# Patient Record
Sex: FEMALE | Race: WHITE | Employment: OTHER | URBAN - METROPOLITAN AREA
[De-identification: names, ages, dates, MRNs, and addresses within clinical notes are randomized per-mention and may not be internally consistent; named-entity substitution may affect disease eponyms.]

---

## 2017-02-14 ENCOUNTER — OFFICE VISIT (OUTPATIENT)
Dept: FAMILY MEDICINE CLINIC | Age: 62
End: 2017-02-14

## 2017-02-14 VITALS
RESPIRATION RATE: 16 BRPM | HEART RATE: 88 BPM | WEIGHT: 168.25 LBS | HEIGHT: 68 IN | SYSTOLIC BLOOD PRESSURE: 118 MMHG | DIASTOLIC BLOOD PRESSURE: 78 MMHG | BODY MASS INDEX: 25.5 KG/M2

## 2017-02-14 DIAGNOSIS — K21.00 CHRONIC REFLUX ESOPHAGITIS: ICD-10-CM

## 2017-02-14 DIAGNOSIS — J40 BRONCHITIS: Primary | ICD-10-CM

## 2017-02-14 PROCEDURE — 99213 OFFICE O/P EST LOW 20 MIN: CPT | Performed by: EMERGENCY MEDICINE

## 2017-02-14 RX ORDER — PANTOPRAZOLE SODIUM 40 MG/1
40 TABLET, DELAYED RELEASE ORAL DAILY
Qty: 90 TABLET | Refills: 2 | Status: SHIPPED | OUTPATIENT
Start: 2017-02-14 | End: 2017-09-05 | Stop reason: SDUPTHER

## 2017-02-14 RX ORDER — LEVOFLOXACIN 500 MG/1
500 TABLET, FILM COATED ORAL DAILY
Qty: 10 TABLET | Refills: 0 | Status: SHIPPED | OUTPATIENT
Start: 2017-02-14 | End: 2017-02-24

## 2017-02-14 ASSESSMENT — ENCOUNTER SYMPTOMS
TROUBLE SWALLOWING: 0
CHEST TIGHTNESS: 0
COUGH: 1
DIARRHEA: 0
SORE THROAT: 1
VOMITING: 0
BACK PAIN: 0
CONSTIPATION: 0
SINUS PRESSURE: 0
SHORTNESS OF BREATH: 0
ABDOMINAL PAIN: 0
VOICE CHANGE: 0
NAUSEA: 0
RHINORRHEA: 0
WHEEZING: 0

## 2017-02-16 ENCOUNTER — TELEPHONE (OUTPATIENT)
Dept: FAMILY MEDICINE CLINIC | Age: 62
End: 2017-02-16

## 2017-03-24 ENCOUNTER — TELEPHONE (OUTPATIENT)
Dept: FAMILY MEDICINE CLINIC | Age: 62
End: 2017-03-24

## 2017-03-24 RX ORDER — AZITHROMYCIN 250 MG/1
TABLET, FILM COATED ORAL
Qty: 1 PACKET | Refills: 0 | Status: SHIPPED | OUTPATIENT
Start: 2017-03-24 | End: 2017-04-03

## 2017-04-04 RX ORDER — ESCITALOPRAM OXALATE 10 MG/1
TABLET ORAL
Qty: 30 TABLET | Refills: 0 | Status: SHIPPED | OUTPATIENT
Start: 2017-04-04 | End: 2017-05-07 | Stop reason: SDUPTHER

## 2017-08-28 RX ORDER — ESCITALOPRAM OXALATE 10 MG/1
TABLET ORAL
Qty: 30 TABLET | Refills: 0 | Status: SHIPPED | OUTPATIENT
Start: 2017-08-28 | End: 2017-09-05 | Stop reason: SDUPTHER

## 2017-09-05 ENCOUNTER — OFFICE VISIT (OUTPATIENT)
Dept: FAMILY MEDICINE CLINIC | Age: 62
End: 2017-09-05

## 2017-09-05 VITALS
HEIGHT: 68 IN | RESPIRATION RATE: 14 BRPM | DIASTOLIC BLOOD PRESSURE: 76 MMHG | HEART RATE: 76 BPM | BODY MASS INDEX: 28.01 KG/M2 | SYSTOLIC BLOOD PRESSURE: 134 MMHG | WEIGHT: 184.8 LBS

## 2017-09-05 DIAGNOSIS — K29.51 CHRONIC GASTRITIS WITH BLEEDING, UNSPECIFIED GASTRITIS TYPE: ICD-10-CM

## 2017-09-05 DIAGNOSIS — K21.00 CHRONIC REFLUX ESOPHAGITIS: ICD-10-CM

## 2017-09-05 DIAGNOSIS — K52.9 IBD (INFLAMMATORY BOWEL DISEASE): ICD-10-CM

## 2017-09-05 DIAGNOSIS — L40.9 PSORIASIS: Primary | ICD-10-CM

## 2017-09-05 DIAGNOSIS — F32.A DEPRESSION, UNSPECIFIED DEPRESSION TYPE: ICD-10-CM

## 2017-09-05 PROCEDURE — 99213 OFFICE O/P EST LOW 20 MIN: CPT | Performed by: EMERGENCY MEDICINE

## 2017-09-05 RX ORDER — PANTOPRAZOLE SODIUM 40 MG/1
40 TABLET, DELAYED RELEASE ORAL 2 TIMES DAILY
Qty: 180 TABLET | Refills: 2 | Status: SHIPPED | OUTPATIENT
Start: 2017-09-05 | End: 2018-10-18 | Stop reason: SDUPTHER

## 2017-09-05 RX ORDER — ESCITALOPRAM OXALATE 20 MG/1
20 TABLET ORAL DAILY
Qty: 90 TABLET | Refills: 1 | Status: SHIPPED | OUTPATIENT
Start: 2017-09-05 | End: 2018-02-20 | Stop reason: ALTCHOICE

## 2017-09-05 ASSESSMENT — ENCOUNTER SYMPTOMS
DIARRHEA: 0
BACK PAIN: 0
SINUS PRESSURE: 0
CONSTIPATION: 0
VOMITING: 0
VOICE CHANGE: 0
WHEEZING: 0
RHINORRHEA: 0
CHEST TIGHTNESS: 0
NAUSEA: 0
TROUBLE SWALLOWING: 0
COUGH: 0
SHORTNESS OF BREATH: 0
SORE THROAT: 0
ABDOMINAL PAIN: 0

## 2017-09-27 RX ORDER — ESCITALOPRAM OXALATE 10 MG/1
TABLET ORAL
Qty: 30 TABLET | Refills: 5 | Status: SHIPPED | OUTPATIENT
Start: 2017-09-27 | End: 2018-02-06 | Stop reason: ALTCHOICE

## 2018-02-06 ENCOUNTER — OFFICE VISIT (OUTPATIENT)
Dept: FAMILY MEDICINE CLINIC | Age: 63
End: 2018-02-06

## 2018-02-06 VITALS
BODY MASS INDEX: 28.72 KG/M2 | HEART RATE: 78 BPM | DIASTOLIC BLOOD PRESSURE: 90 MMHG | HEIGHT: 68 IN | WEIGHT: 189.5 LBS | SYSTOLIC BLOOD PRESSURE: 138 MMHG | RESPIRATION RATE: 14 BRPM

## 2018-02-06 DIAGNOSIS — S39.012A STRAIN OF LUMBAR REGION, INITIAL ENCOUNTER: Primary | ICD-10-CM

## 2018-02-06 DIAGNOSIS — S76.011A HIP STRAIN, RIGHT, INITIAL ENCOUNTER: ICD-10-CM

## 2018-02-06 DIAGNOSIS — S80.11XA TRAUMATIC ECCHYMOSIS OF LOWER LEG, RIGHT, INITIAL ENCOUNTER: ICD-10-CM

## 2018-02-06 PROCEDURE — 99213 OFFICE O/P EST LOW 20 MIN: CPT | Performed by: EMERGENCY MEDICINE

## 2018-02-06 ASSESSMENT — ENCOUNTER SYMPTOMS
DIARRHEA: 0
ABDOMINAL PAIN: 0
SHORTNESS OF BREATH: 0
VOICE CHANGE: 0
RHINORRHEA: 0
CONSTIPATION: 0
BACK PAIN: 1
WHEEZING: 0
SORE THROAT: 0
TROUBLE SWALLOWING: 0
NAUSEA: 0
SINUS PRESSURE: 0
COUGH: 0
VOMITING: 0
CHEST TIGHTNESS: 0

## 2018-02-07 ENCOUNTER — HOSPITAL ENCOUNTER (OUTPATIENT)
Dept: GENERAL RADIOLOGY | Age: 63
Discharge: HOME | End: 2018-02-07
Payer: COMMERCIAL

## 2018-02-07 ENCOUNTER — HOSPITAL ENCOUNTER (OUTPATIENT)
Age: 63
Discharge: HOME | End: 2018-02-07
Payer: COMMERCIAL

## 2018-02-07 DIAGNOSIS — S39.012A STRAIN OF LUMBAR REGION, INITIAL ENCOUNTER: ICD-10-CM

## 2018-02-07 DIAGNOSIS — S76.011A HIP STRAIN, RIGHT, INITIAL ENCOUNTER: ICD-10-CM

## 2018-02-07 DIAGNOSIS — S80.11XA TRAUMATIC ECCHYMOSIS OF LOWER LEG, RIGHT, INITIAL ENCOUNTER: ICD-10-CM

## 2018-02-07 PROCEDURE — 72170 X-RAY EXAM OF PELVIS: CPT

## 2018-02-07 PROCEDURE — 72100 X-RAY EXAM L-S SPINE 2/3 VWS: CPT

## 2018-02-07 PROCEDURE — 73590 X-RAY EXAM OF LOWER LEG: CPT

## 2018-02-08 ENCOUNTER — TELEPHONE (OUTPATIENT)
Dept: FAMILY MEDICINE CLINIC | Age: 63
End: 2018-02-08

## 2018-02-08 DIAGNOSIS — S33.5XXA LUMBAR SPRAIN, INITIAL ENCOUNTER: Primary | ICD-10-CM

## 2018-02-08 DIAGNOSIS — S76.011D STRAIN OF RIGHT HIP, SUBSEQUENT ENCOUNTER: ICD-10-CM
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
## 2018-02-08 RX ORDER — TRAMADOL HYDROCHLORIDE 50 MG/1
50 TABLET ORAL EVERY 6 HOURS PRN
Qty: 28 TABLET | Refills: 0 | Status: SHIPPED | OUTPATIENT
Start: 2018-02-08 | End: 2018-02-15

## 2018-02-08 RX ORDER — PREDNISONE 10 MG/1
TABLET ORAL
Qty: 15 TABLET | Refills: 0 | Status: SHIPPED | OUTPATIENT
Start: 2018-02-08 | End: 2018-02-20 | Stop reason: ALTCHOICE

## 2018-02-08 NOTE — TELEPHONE ENCOUNTER
----- Message from Deborah Arredondo MD sent at 2/8/2018 10:15 AM EST -----  Please call the patient, lumbar spine showed degenerative disc disease, old compression fracture at L1, knee joint showed osteoarthritis, no fracture of the tibia, pelvis showed osteoarthritis and hip and SI joint

## 2018-02-20 ENCOUNTER — OFFICE VISIT (OUTPATIENT)
Dept: FAMILY MEDICINE CLINIC | Age: 63
End: 2018-02-20

## 2018-02-20 VITALS
DIASTOLIC BLOOD PRESSURE: 84 MMHG | TEMPERATURE: 98.3 F | SYSTOLIC BLOOD PRESSURE: 130 MMHG | WEIGHT: 186.8 LBS | RESPIRATION RATE: 14 BRPM | HEIGHT: 68 IN | BODY MASS INDEX: 28.31 KG/M2 | HEART RATE: 76 BPM

## 2018-02-20 DIAGNOSIS — J40 BRONCHITIS: Primary | ICD-10-CM

## 2018-02-20 DIAGNOSIS — K29.50 CHRONIC GASTRITIS, PRESENCE OF BLEEDING UNSPECIFIED, UNSPECIFIED GASTRITIS TYPE: ICD-10-CM

## 2018-02-20 DIAGNOSIS — K21.00 CHRONIC REFLUX ESOPHAGITIS: ICD-10-CM

## 2018-02-20 DIAGNOSIS — J06.9 UPPER RESPIRATORY TRACT INFECTION, UNSPECIFIED TYPE: ICD-10-CM

## 2018-02-20 PROCEDURE — 99213 OFFICE O/P EST LOW 20 MIN: CPT | Performed by: EMERGENCY MEDICINE

## 2018-02-20 RX ORDER — SUCRALFATE ORAL 1 G/10ML
1 SUSPENSION ORAL 4 TIMES DAILY
Qty: 1200 ML | Refills: 3 | Status: SHIPPED | OUTPATIENT
Start: 2018-02-20 | End: 2020-06-18 | Stop reason: SDUPTHER

## 2018-02-20 RX ORDER — LEVOFLOXACIN 500 MG/1
500 TABLET, FILM COATED ORAL DAILY
Qty: 10 TABLET | Refills: 0 | Status: SHIPPED | OUTPATIENT
Start: 2018-02-20 | End: 2018-03-02

## 2018-02-20 ASSESSMENT — ENCOUNTER SYMPTOMS
SINUS PRESSURE: 1
SHORTNESS OF BREATH: 1
COUGH: 1
RHINORRHEA: 1
WHEEZING: 1

## 2018-02-20 NOTE — PROGRESS NOTES
Visit Date: 2/20/2018    Subjective:    Noa Gray is a 66 y.o. female who presents today for:  Chief Complaint   Patient presents with    Cough    Chest Congestion    Generalized Body Aches         HPI:     HPI     cough and cold for the past 3-4 days. No fever. Coughing green brown phlem. No vomiting or diarrhea    Still having reflux when sleeping, , reflux wakes her up and cause her to vomit      Current Home Medications:  Current Outpatient Prescriptions   Medication Sig Dispense Refill    levofloxacin (LEVAQUIN) 500 MG tablet Take 1 tablet by mouth daily for 10 days 10 tablet 0    sucralfate (CARAFATE) 1 GM/10ML suspension Take 10 mLs by mouth 4 times daily 1200 mL 3    pantoprazole (PROTONIX) 40 MG tablet Take 1 tablet by mouth 2 times daily 180 tablet 2     No current facility-administered medications for this visit. Subjective:      Review of Systems   HENT: Positive for congestion, rhinorrhea and sinus pressure. Respiratory: Positive for cough, shortness of breath and wheezing. Objective:     /84 (Site: Right Arm, Position: Sitting, Cuff Size: Medium Adult)   Pulse 76   Temp 98.3 °F (36.8 °C)   Resp 14   Ht 5' 8\" (1.727 m)   Wt 186 lb 12.8 oz (84.7 kg)   Breastfeeding? No   BMI 28.40 kg/m²   BP Readings from Last 3 Encounters:   02/20/18 130/84   02/06/18 (!) 138/90   09/05/17 134/76     Wt Readings from Last 3 Encounters:   02/20/18 186 lb 12.8 oz (84.7 kg)   02/06/18 189 lb 8 oz (86 kg)   09/05/17 184 lb 12.8 oz (83.8 kg)       Physical Exam   Constitutional: She is oriented to person, place, and time. She appears well-developed and well-nourished. She is cooperative. HENT:   Head: Normocephalic and atraumatic. Right Ear: External ear normal.   Left Ear: External ear normal.   Nose: Mucosal edema and rhinorrhea present. Right sinus exhibits no maxillary sinus tenderness. Left sinus exhibits no maxillary sinus tenderness.    Mouth/Throat: Posterior oropharyngeal erythema present. Eyes: Conjunctivae and EOM are normal. Pupils are equal, round, and reactive to light. No scleral icterus. Neck: Normal range of motion. Neck supple. No JVD present. No thyromegaly present. Cardiovascular: Normal rate, regular rhythm and intact distal pulses. Exam reveals no friction rub. No murmur heard. Pulmonary/Chest: Effort normal. She has decreased breath sounds. She has no wheezes. She has rhonchi. She has no rales. She exhibits no tenderness. Abdominal: Soft. Bowel sounds are normal. She exhibits no mass. There is no tenderness. Musculoskeletal: She exhibits no edema. Lymphadenopathy:     She has no cervical adenopathy. Neurological: She is alert and oriented to person, place, and time. Skin: No rash noted. Vitals reviewed. Assessment:        1. Bronchitis     2. Upper respiratory tract infection, unspecified type     3. Chronic gastritis, presence of bleeding unspecified, unspecified gastritis type     4. Chronic reflux esophagitis         Plan:      Medications Prescribed:  Orders Placed This Encounter   Medications    levofloxacin (LEVAQUIN) 500 MG tablet     Sig: Take 1 tablet by mouth daily for 10 days     Dispense:  10 tablet     Refill:  0    sucralfate (CARAFATE) 1 GM/10ML suspension     Sig: Take 10 mLs by mouth 4 times daily     Dispense:  1200 mL     Refill:  3     Orders Placed:  No orders of the defined types were placed in this encounter. Return if symptoms worsen or fail to improve. Discussed use, benefit, and side effects of prescribed medications. All patient questions answered. Pt voiced understanding. Instructed to continue current medications, diet and exercise. Patient agreed with treatment plan.

## 2018-02-22 ENCOUNTER — OFFICE VISIT (OUTPATIENT)
Dept: FAMILY MEDICINE CLINIC | Age: 63
End: 2018-02-22

## 2018-02-22 VITALS
WEIGHT: 186.2 LBS | SYSTOLIC BLOOD PRESSURE: 114 MMHG | HEIGHT: 68 IN | DIASTOLIC BLOOD PRESSURE: 76 MMHG | RESPIRATION RATE: 14 BRPM | HEART RATE: 86 BPM | BODY MASS INDEX: 28.22 KG/M2

## 2018-02-22 DIAGNOSIS — J40 BRONCHITIS: ICD-10-CM

## 2018-02-22 DIAGNOSIS — R68.89 FLU-LIKE SYMPTOMS: Primary | ICD-10-CM

## 2018-02-22 PROCEDURE — 99213 OFFICE O/P EST LOW 20 MIN: CPT | Performed by: EMERGENCY MEDICINE

## 2018-02-22 RX ORDER — OSELTAMIVIR PHOSPHATE 75 MG/1
75 CAPSULE ORAL 2 TIMES DAILY
Qty: 10 CAPSULE | Refills: 0 | Status: SHIPPED | OUTPATIENT
Start: 2018-02-22 | End: 2018-02-27

## 2018-02-22 ASSESSMENT — ENCOUNTER SYMPTOMS
WHEEZING: 0
COUGH: 1
BACK PAIN: 0
NAUSEA: 0
VOICE CHANGE: 0
SINUS PRESSURE: 0
CHEST TIGHTNESS: 0
TROUBLE SWALLOWING: 0
RHINORRHEA: 1
VOMITING: 0
CONSTIPATION: 0
ABDOMINAL PAIN: 0
DIARRHEA: 0
SORE THROAT: 0
SHORTNESS OF BREATH: 0

## 2018-02-22 NOTE — PROGRESS NOTES
130/84   02/06/18 (!) 138/90     Wt Readings from Last 3 Encounters:   02/22/18 186 lb 3.2 oz (84.5 kg)   02/20/18 186 lb 12.8 oz (84.7 kg)   02/06/18 189 lb 8 oz (86 kg)       Physical Exam   Constitutional: She is oriented to person, place, and time. She appears well-developed and well-nourished. She is cooperative. HENT:   Head: Normocephalic and atraumatic. Right Ear: External ear normal.   Left Ear: External ear normal.   Nose: Mucosal edema and rhinorrhea present. Mouth/Throat: Posterior oropharyngeal erythema present. Eyes: Conjunctivae and EOM are normal. Pupils are equal, round, and reactive to light. No scleral icterus. Neck: Normal range of motion. Neck supple. No JVD present. No thyromegaly present. Cardiovascular: Normal rate, regular rhythm and intact distal pulses. Exam reveals no friction rub. No murmur heard. Pulmonary/Chest: Effort normal. She has decreased breath sounds. She has no wheezes. She has rhonchi. She has no rales. She exhibits no tenderness. Abdominal: Soft. Bowel sounds are normal. She exhibits no mass. There is no tenderness. Musculoskeletal: She exhibits no edema. Lymphadenopathy:     She has no cervical adenopathy. Neurological: She is alert and oriented to person, place, and time. Skin: No rash noted. Vitals reviewed. Assessment:        1. Flu-like symptoms     2. Bronchitis         Plan:      Medications Prescribed:  Orders Placed This Encounter   Medications    oseltamivir (TAMIFLU) 75 MG capsule     Sig: Take 1 capsule by mouth 2 times daily for 5 days     Dispense:  10 capsule     Refill:  0     Orders Placed:  No orders of the defined types were placed in this encounter. No Follow-up on file. Discussed use, benefit, and side effects of prescribed medications. All patient questions answered. Pt voiced understanding. Instructed to continue current medications, diet and exercise. Patient agreed with treatment plan.

## 2018-02-22 NOTE — LETTER
[facility]  [address],[facility] [address]  Phone: [phone]  Fax: [phone]    Laura Cason MD        February 22, 2018     Patient: Teresa Ca   YOB: 1955   Date of Visit: 2/22/2018       To Whom it May Concern:    Gonzalo Spain was seen in my clinic on 2/22/2018. She is still sick Please extend her excuse from work. She may return to work on 2/26/18. If you have any questions or concerns, please don't hesitate to call.     Sincerely,         Laura Cason MD

## 2018-09-25 ENCOUNTER — TELEPHONE (OUTPATIENT)
Dept: FAMILY MEDICINE CLINIC | Age: 63
End: 2018-09-25

## 2018-09-25 RX ORDER — AZITHROMYCIN 250 MG/1
TABLET, FILM COATED ORAL
Qty: 1 PACKET | Refills: 0 | Status: SHIPPED | OUTPATIENT
Start: 2018-09-25 | End: 2018-09-28

## 2018-09-25 NOTE — TELEPHONE ENCOUNTER
Pt called stating that she has cough, head and chest congestion, stuffy nose, and low grade fever. She is asking for something to be called in to help.     Send to [facility] in [facility]

## 2018-09-26 ENCOUNTER — OFFICE VISIT (OUTPATIENT)
Dept: FAMILY MEDICINE CLINIC | Age: 63
End: 2018-09-26

## 2018-09-26 VITALS
BODY MASS INDEX: 25.85 KG/M2 | SYSTOLIC BLOOD PRESSURE: 120 MMHG | TEMPERATURE: 98.2 F | DIASTOLIC BLOOD PRESSURE: 70 MMHG | HEIGHT: 68 IN | HEART RATE: 80 BPM | RESPIRATION RATE: 16 BRPM | WEIGHT: 170.6 LBS

## 2018-09-26 DIAGNOSIS — J01.00 ACUTE NON-RECURRENT MAXILLARY SINUSITIS: Primary | ICD-10-CM

## 2018-09-26 PROCEDURE — 99213 OFFICE O/P EST LOW 20 MIN: CPT | Performed by: FAMILY MEDICINE

## 2018-09-26 ASSESSMENT — PATIENT HEALTH QUESTIONNAIRE - PHQ9
SUM OF ALL RESPONSES TO PHQ QUESTIONS 1-9: 0
SUM OF ALL RESPONSES TO PHQ QUESTIONS 1-9: 0
1. LITTLE INTEREST OR PLEASURE IN DOING THINGS: 0

## 2018-09-26 ASSESSMENT — ENCOUNTER SYMPTOMS
SINUS PRESSURE: 1
ABDOMINAL PAIN: 0
CONSTIPATION: 0
SHORTNESS OF BREATH: 0
SINUS PAIN: 1
CHEST TIGHTNESS: 0
COUGH: 1
NAUSEA: 0
DIARRHEA: 0
EYES NEGATIVE: 1
SORE THROAT: 0
VOMITING: 0

## 2018-09-26 NOTE — LETTER
[facility]  [address] [address]  Phone: [phone]  Fax: [phone]    Jerica Scherer MD        September 26, 2018     Patient: Hien Flores   YOB: 1955   Date of Visit: 9/26/2018       To Whom it May Concern:    Hamilton Elizabeth was seen in my clinic on 9/26/2018. She may return to work on 9/28/18. If you have any questions or concerns, please don't hesitate to call.     Sincerely,         Jerica Scherer MD

## 2018-09-26 NOTE — PROGRESS NOTES
Visit Date: 9/26/2018    Rebecca Gibson is a 66 y.o. female who presents today for:  Chief Complaint   Patient presents with    Nasal Congestion    Chest Congestion    Cough she states that it is mostly dry    Fatigue she feels tired. She just started a Z-pack last night. HPI:     HPI    has a current medication list which includes the following prescription(s): azithromycin, sucralfate, and pantoprazole. Allergies   Allergen Reactions    Codeine Nausea And Vomiting and Other (See Comments)     headache    Penicillins Rash       Social History   Substance Use Topics    Smoking status: Former Smoker     Packs/day: 0.00     Years: 0.00     Types: Cigarettes     Quit date: 9/26/2001    Smokeless tobacco: Never Used    Alcohol use No       Past Medical History:   Diagnosis Date    Anemia 4/3/14    Chronic gastritis 4/22/14    Chronic reflux esophagitis 4/22/14    Compression fracture     Depression     Diarrhea     Fracture     compression    Fracture of pelvis ([facility] [address] 2008    GI bleed     History of colon polyps     Psoriasis     scalp    Weight loss        Past Surgical History:   Procedure Laterality Date    CARPAL TUNNEL RELEASE      bilateral    COLONOSCOPY  2010    DILATION AND CURETTAGE OF UTERUS      OTHER SURGICAL HISTORY  July 2014    \"nodes\" removed from left armpit - Dr. Natalie Hensley       Family History   Problem Relation Age of Onset    Heart Disease Mother     High Blood Pressure Mother     Colon Cancer Sister     Crohn's Disease Sister      Subjective:      Review of Systems   Constitutional: Positive for fatigue (she feels tired) and fever ( low grade since Sunday on and off ). Negative for activity change, appetite change and diaphoresis. HENT: Positive for congestion, sinus pain and sinus pressure. Negative for sore throat. Eyes: Negative. Respiratory: Positive for cough (dry). Negative for chest tightness and shortness of breath.     Cardiovascular: Negative for

## 2018-09-28 ENCOUNTER — TELEPHONE (OUTPATIENT)
Dept: FAMILY MEDICINE CLINIC | Age: 63
End: 2018-09-28

## 2018-09-28 RX ORDER — LEVOFLOXACIN 500 MG/1
500 TABLET, FILM COATED ORAL DAILY
Qty: 10 TABLET | Refills: 0 | Status: SHIPPED | OUTPATIENT
Start: 2018-09-28 | End: 2018-10-08

## 2018-09-28 NOTE — LETTER
[facility]  [address],[facility] [address]  Phone: [phone]  Fax: [phone]    Neelima Vee MD        September 28, 2018     Patient: Raji Villasenor   YOB: 1955   Date of Visit: 9/28/2018       To Whom it May Concern:    Kevan Estrada was seen in my clinic on 9/26/2018. She may return to work on 9/29/2018. If you have any questions or concerns, please don't hesitate to call.     Sincerely,         Neelima Vee MD

## 2018-09-28 NOTE — TELEPHONE ENCOUNTER
Please let her know to stop her Zithromax and will start Levaquin  Follow up if not better or worse.

## 2018-10-02 ENCOUNTER — OFFICE VISIT (OUTPATIENT)
Dept: FAMILY MEDICINE CLINIC | Age: 63
End: 2018-10-02

## 2018-10-02 VITALS
BODY MASS INDEX: 25.58 KG/M2 | SYSTOLIC BLOOD PRESSURE: 140 MMHG | DIASTOLIC BLOOD PRESSURE: 86 MMHG | HEART RATE: 76 BPM | HEIGHT: 68 IN | RESPIRATION RATE: 16 BRPM | WEIGHT: 168.8 LBS

## 2018-10-02 DIAGNOSIS — R05.3 PERSISTENT DRY COUGH: Primary | ICD-10-CM

## 2018-10-02 DIAGNOSIS — J06.9 ACUTE UPPER RESPIRATORY INFECTION, UNSPECIFIED: ICD-10-CM

## 2018-10-02 PROCEDURE — 99213 OFFICE O/P EST LOW 20 MIN: CPT | Performed by: EMERGENCY MEDICINE

## 2018-10-02 RX ORDER — PREDNISONE 10 MG/1
TABLET ORAL
Qty: 16 TABLET | Refills: 0 | Status: SHIPPED | OUTPATIENT
Start: 2018-10-02 | End: 2020-06-18

## 2018-10-02 RX ORDER — HYDROCODONE POLISTIREX AND CHLORPHENIRAMINE POLISTIREX 10; 8 MG/5ML; MG/5ML
5 SUSPENSION, EXTENDED RELEASE ORAL EVERY 12 HOURS PRN
Qty: 115 ML | Refills: 0 | Status: SHIPPED | OUTPATIENT
Start: 2018-10-02 | End: 2018-10-12

## 2018-10-02 ASSESSMENT — ENCOUNTER SYMPTOMS
BACK PAIN: 0
ABDOMINAL PAIN: 0
WHEEZING: 0
NAUSEA: 0
SHORTNESS OF BREATH: 0
CHEST TIGHTNESS: 0
CONSTIPATION: 0
SINUS PRESSURE: 0
COUGH: 1
DIARRHEA: 0
VOICE CHANGE: 0
RHINORRHEA: 0
TROUBLE SWALLOWING: 0
VOMITING: 0
SORE THROAT: 0

## 2018-10-02 NOTE — PROGRESS NOTES
Visit Date: 10/2/2018    Subjective:    Medina Bliss is a 66 y.o. female who presents today for:  Chief Complaint   Patient presents with    Cough    Chest Congestion         HPI:     HPI     9 days ago started as sore throat, coughing since then. Had fever and chill the first few days. Finish the course of Z-bharathi the called in [address] 9/28/18    Voice been hoarse    Do not feel good , feel exhausted      Current Home Medications:  Current Outpatient Prescriptions   Medication Sig Dispense Refill    hydrocodone-chlorpheniramine (TUSSIONEX PENNKINETIC ER) 10-8 MG/5ML SUER Take 5 mLs by mouth every 12 hours as needed (cough) for up to 10 days. Gita Avila 115 mL 0    predniSONE (DELTASONE) 10 MG tablet 2 tablets 2 times a day for 2 days then 1  Tablet 2 times a day for 2 days, then 1 tablet daily till gone 16 tablet 0    levofloxacin (LEVAQUIN) 500 MG tablet Take 1 tablet by mouth daily for 10 days 10 tablet 0    sucralfate (CARAFATE) 1 GM/10ML suspension Take 10 mLs by mouth 4 times daily 1200 mL 3    pantoprazole (PROTONIX) 40 MG tablet Take 1 tablet by mouth 2 times daily 180 tablet 2     No current facility-administered medications for this visit. Subjective:      Review of Systems   Constitutional: Negative for appetite change, chills, diaphoresis, fatigue and fever. HENT: Positive for congestion. Negative for ear pain, postnasal drip, rhinorrhea, sinus pressure, sneezing, sore throat, trouble swallowing and voice change. Respiratory: Positive for cough. Negative for chest tightness, shortness of breath and wheezing. Cardiovascular: Negative for chest pain, palpitations and leg swelling. Gastrointestinal: Negative for abdominal pain, constipation, diarrhea, nausea and vomiting. Musculoskeletal: Negative for arthralgias, back pain, joint swelling, myalgias, neck pain and neck stiffness. Neurological: Negative for dizziness, syncope, weakness, light-headedness, numbness and headaches. PENNKINETIC ER) 10-8 MG/5ML SUER     Sig: Take 5 mLs by mouth every 12 hours as needed (cough) for up to 10 days. .     Dispense:  115 mL     Refill:  0    predniSONE (DELTASONE) 10 MG tablet     Si tablets 2 times a day for 2 days then 1  Tablet 2 times a day for 2 days, then 1 tablet daily till gone     Dispense:  16 tablet     Refill:  0     Orders Placed:  Orders Placed This Encounter   Procedures    XR CHEST STANDARD (2 VW)     Standing Status:   Future     Standing Expiration Date:   10/2/2019    Comprehensive Metabolic Panel     Standing Status:   Future     Standing Expiration Date:   10/2/2019    CBC with Differential     Standing Status:   Future     Standing Expiration Date:   10/2/2019       Return in about 2 weeks (around 10/16/2018), or if symptoms worsen or fail to improve. Discussed use, benefit, and side effects of prescribed medications. All patient questions answered. Pt voiced understanding. Instructed to continue current medications, diet and exercise. Patient agreed with treatment plan.

## 2018-10-23 ENCOUNTER — TELEPHONE (OUTPATIENT)
Dept: FAMILY MEDICINE CLINIC | Age: 63
End: 2018-10-23

## 2019-01-03 RX ORDER — ESCITALOPRAM OXALATE 20 MG/1
TABLET ORAL
Qty: 30 TABLET | Refills: 5 | Status: SHIPPED | OUTPATIENT
Start: 2019-01-03 | End: 2020-06-18

## 2020-03-10 RX ORDER — PANTOPRAZOLE SODIUM 40 MG/1
TABLET, DELAYED RELEASE ORAL
Qty: 60 TABLET | Refills: 0 | Status: SHIPPED | OUTPATIENT
Start: 2020-03-10 | End: 2020-06-18 | Stop reason: SDUPTHER

## 2020-03-10 NOTE — TELEPHONE ENCOUNTER
Date of last visit:  10/2/2018  Date of next visit:  Visit date not found    Requested Prescriptions     Pending Prescriptions Disp Refills    pantoprazole (PROTONIX) 40 MG tablet [Pharmacy Med Name: Pantoprazole Sodium 40 MG Oral Tablet Delayed Release] 60 tablet 0     Sig: Take 1 tablet by mouth twice daily

## 2020-06-18 ENCOUNTER — OFFICE VISIT (OUTPATIENT)
Dept: FAMILY MEDICINE CLINIC | Age: 65
End: 2020-06-18

## 2020-06-18 VITALS
BODY MASS INDEX: 27.55 KG/M2 | TEMPERATURE: 98.3 F | DIASTOLIC BLOOD PRESSURE: 88 MMHG | HEART RATE: 78 BPM | HEIGHT: 67 IN | RESPIRATION RATE: 16 BRPM | SYSTOLIC BLOOD PRESSURE: 136 MMHG | WEIGHT: 175.5 LBS

## 2020-06-18 PROCEDURE — 1036F TOBACCO NON-USER: CPT | Performed by: EMERGENCY MEDICINE

## 2020-06-18 PROCEDURE — G8419 CALC BMI OUT NRM PARAM NOF/U: HCPCS | Performed by: EMERGENCY MEDICINE

## 2020-06-18 PROCEDURE — G8427 DOCREV CUR MEDS BY ELIG CLIN: HCPCS | Performed by: EMERGENCY MEDICINE

## 2020-06-18 PROCEDURE — 99213 OFFICE O/P EST LOW 20 MIN: CPT | Performed by: EMERGENCY MEDICINE

## 2020-06-18 PROCEDURE — 3017F COLORECTAL CA SCREEN DOC REV: CPT | Performed by: EMERGENCY MEDICINE

## 2020-06-18 RX ORDER — SUCRALFATE ORAL 1 G/10ML
1 SUSPENSION ORAL 4 TIMES DAILY
Qty: 1200 ML | Refills: 3 | Status: SHIPPED | OUTPATIENT
Start: 2020-06-18

## 2020-06-18 RX ORDER — PANTOPRAZOLE SODIUM 40 MG/1
TABLET, DELAYED RELEASE ORAL
Qty: 60 TABLET | Refills: 5 | Status: SHIPPED | OUTPATIENT
Start: 2020-06-18 | End: 2021-08-10 | Stop reason: SDUPTHER

## 2020-06-18 ASSESSMENT — ENCOUNTER SYMPTOMS
SINUS PRESSURE: 0
VOICE CHANGE: 0
VOMITING: 0
ABDOMINAL PAIN: 0
RHINORRHEA: 0
SHORTNESS OF BREATH: 0
CHEST TIGHTNESS: 0
COUGH: 0
TROUBLE SWALLOWING: 0
DIARRHEA: 0
CONSTIPATION: 0
BACK PAIN: 0
WHEEZING: 0
NAUSEA: 0
SORE THROAT: 0

## 2020-06-23 ENCOUNTER — TELEPHONE (OUTPATIENT)
Dept: FAMILY MEDICINE CLINIC | Age: 65
End: 2020-06-23

## 2020-06-23 NOTE — TELEPHONE ENCOUNTER
Patient called stating that Dr Jonny Gonzalez office called and stated it would be January before he could see her (Appt scheduled for 1/20/21). Advised her to check with her insurance - [employer] to see if there is another Rheumatologist on her plan. She doesn't want to wait that long.

## 2020-06-26 NOTE — TELEPHONE ENCOUNTER
Spoke with pt and she has not gotten a chance to call Timur Cox but she will call back when she finds out.

## 2020-06-29 NOTE — TELEPHONE ENCOUNTER
Pt called stating that she checked with insurance and the following are in network. Please do referral to one of them as she is not able to get into Dr. Geovani Jon until January.     Dr. Lloyd Rico may be reached at [phone]

## 2020-07-02 ENCOUNTER — TELEPHONE (OUTPATIENT)
Dept: FAMILY MEDICINE CLINIC | Age: 65
End: 2020-07-02

## 2020-07-02 NOTE — TELEPHONE ENCOUNTER
Pt called back and she either needs a referral to the following two: [address]  [phone]    She said that this is what they gave her.

## 2020-07-02 NOTE — TELEPHONE ENCOUNTER
Pt called asking for something to be called in for her for her knee pain as she waits to see Rheumatology. She stated that she is taking a lot of tylenol for the pain. She woke up this morning in so much pain that she has been crying. Pt unable to make appointment at she has another appointment.     Send to [facility] in [place]

## 2020-07-02 NOTE — TELEPHONE ENCOUNTER
Dr Vernell Hernandez left a year ago. She is practicing in [address] [place] [address]. Pt said that she will check with her plan again and give us a call.

## 2020-07-02 NOTE — TELEPHONE ENCOUNTER
Please referred the patient to Dr. Neetu Valiente in [facility], please set appointment

## 2020-07-08 NOTE — TELEPHONE ENCOUNTER
Patient called back stating she doesn't want to drive an hour to [facility] to see a Rheumatologist.    She is wondering about possibly referral to Dermatologist since she is doing a lot of head scratching to the point of causing blood. Please call her back.

## 2020-07-08 NOTE — TELEPHONE ENCOUNTER
[facility] is Dr. Victoria Rosen he does not take new patient, [facility] I am not sure, looks like the doctor left went to [place].   The closest one is Dr. Thomas Canas, please call [phone] if they accept the patient and her insurance    Otherwise the patient had to wait for Dr. Noelle Burnett here in [address]

## 2020-07-09 RX ORDER — DICLOFENAC SODIUM AND MISOPROSTOL 50; 200 MG/1; UG/1
1 TABLET, DELAYED RELEASE ORAL 2 TIMES DAILY
Qty: 60 TABLET | Refills: 1 | Status: SHIPPED | OUTPATIENT
Start: 2020-07-09 | End: 2021-08-10

## 2020-09-23 ENCOUNTER — TELEPHONE (OUTPATIENT)
Dept: FAMILY MEDICINE CLINIC | Age: 65
End: 2020-09-23

## 2020-09-23 NOTE — TELEPHONE ENCOUNTER
Patient called C/O head congestion, dry cough and green nasal drainage, also slight sore throat from drainage just started yesterday. No fever. Req RX to [facility] in [place].     Please call her back [phone]

## 2020-09-23 NOTE — TELEPHONE ENCOUNTER
Pt stated she doesn't understand the telemedicine and why it needs done. I informed the pt it was because you need to see her because she is not your pt. When pt was informed that she would have to download google duo she stated \"I'm not doing that, that's too much, never mind,  I'll just die! \" and she hung up on me

## 2020-09-24 RX ORDER — CEFUROXIME AXETIL 250 MG/1
250 TABLET ORAL 2 TIMES DAILY
Qty: 20 TABLET | Refills: 0 | Status: SHIPPED | OUTPATIENT
Start: 2020-09-24 | End: 2020-10-04

## 2020-09-24 NOTE — TELEPHONE ENCOUNTER
Pt called back stating she is still having   Cough,sinus congestion, drainage. Sinus mucus is yellow.     Pt req ATB to   [facility]     Please call pt once addressed

## 2021-01-20 ENCOUNTER — HOSPITAL ENCOUNTER (OUTPATIENT)
Age: 66
Discharge: HOME | End: 2021-01-20
Payer: COMMERCIAL

## 2021-01-20 ENCOUNTER — HOSPITAL ENCOUNTER (OUTPATIENT)
Dept: GENERAL RADIOLOGY | Age: 66
Discharge: HOME | End: 2021-01-20
Payer: COMMERCIAL

## 2021-01-20 ENCOUNTER — NURSE ONLY (OUTPATIENT)
Dept: LAB | Age: 66
End: 2021-01-20

## 2021-01-20 ENCOUNTER — OFFICE VISIT (OUTPATIENT)
Dept: RHEUMATOLOGY | Age: 66
End: 2021-01-20
Payer: COMMERCIAL

## 2021-01-20 VITALS
HEART RATE: 76 BPM | WEIGHT: 182.2 LBS | BODY MASS INDEX: 28.6 KG/M2 | DIASTOLIC BLOOD PRESSURE: 84 MMHG | HEIGHT: 67 IN | OXYGEN SATURATION: 99 % | SYSTOLIC BLOOD PRESSURE: 140 MMHG

## 2021-01-20 DIAGNOSIS — G89.29 CHRONIC MIDLINE LOW BACK PAIN WITHOUT SCIATICA: ICD-10-CM

## 2021-01-20 DIAGNOSIS — M19.90 INFLAMMATORY ARTHRITIS: ICD-10-CM

## 2021-01-20 DIAGNOSIS — R19.7 DIARRHEA, UNSPECIFIED TYPE: ICD-10-CM

## 2021-01-20 DIAGNOSIS — L40.50 PSA (PSORIATIC ARTHRITIS) (HCC): ICD-10-CM

## 2021-01-20 DIAGNOSIS — L40.9 PSORIASIS: ICD-10-CM

## 2021-01-20 DIAGNOSIS — M25.50 POLYARTHRALGIA: ICD-10-CM

## 2021-01-20 DIAGNOSIS — M54.50 CHRONIC MIDLINE LOW BACK PAIN WITHOUT SCIATICA: ICD-10-CM

## 2021-01-20 DIAGNOSIS — L40.9 PSORIASIS: Primary | ICD-10-CM

## 2021-01-20 LAB
ALBUMIN SERPL-MCNC: 4.6 G/DL (ref 3.5–5.1)
ALP BLD-CCNC: 82 U/L (ref 38–126)
ALT SERPL-CCNC: 12 U/L (ref 11–66)
ANION GAP SERPL CALCULATED.3IONS-SCNC: 12 MEQ/L (ref 8–16)
AST SERPL-CCNC: 19 U/L (ref 5–40)
BASOPHILS # BLD: 0.6 %
BASOPHILS ABSOLUTE: 0 THOU/MM3 (ref 0–0.1)
BILIRUB SERPL-MCNC: 0.3 MG/DL (ref 0.3–1.2)
BUN BLDV-MCNC: 14 MG/DL (ref 7–22)
C-REACTIVE PROTEIN: 0.28 MG/DL (ref 0–1)
CALCIUM SERPL-MCNC: 9.3 MG/DL (ref 8.5–10.5)
CHLORIDE BLD-SCNC: 104 MEQ/L (ref 98–111)
CO2: 26 MEQ/L (ref 23–33)
CREAT SERPL-MCNC: 0.7 MG/DL (ref 0.4–1.2)
EOSINOPHIL # BLD: 1.9 %
EOSINOPHILS ABSOLUTE: 0.1 THOU/MM3 (ref 0–0.4)
ERYTHROCYTE [DISTWIDTH] IN BLOOD BY AUTOMATED COUNT: 13.3 % (ref 11.5–14.5)
ERYTHROCYTE [DISTWIDTH] IN BLOOD BY AUTOMATED COUNT: 46.5 FL (ref 35–45)
GFR SERPL CREATININE-BSD FRML MDRD: 84 ML/MIN/1.73M2
GLUCOSE BLD-MCNC: 83 MG/DL (ref 70–108)
HAV IGM SER IA-ACNC: NEGATIVE
HCT VFR BLD CALC: 44.9 % (ref 37–47)
HEMOGLOBIN: 14.3 GM/DL (ref 12–16)
HEPATITIS B CORE IGM ANTIBODY: NEGATIVE
HEPATITIS B SURFACE ANTIGEN: NEGATIVE
HEPATITIS C ANTIBODY: NEGATIVE
IMMATURE GRANS (ABS): 0.02 THOU/MM3 (ref 0–0.07)
IMMATURE GRANULOCYTES: 0.4 %
LYMPHOCYTES # BLD: 26 %
LYMPHOCYTES ABSOLUTE: 1.4 THOU/MM3 (ref 1–4.8)
MCH RBC QN AUTO: 30.5 PG (ref 26–33)
MCHC RBC AUTO-ENTMCNC: 31.8 GM/DL (ref 32.2–35.5)
MCV RBC AUTO: 95.7 FL (ref 81–99)
MONOCYTES # BLD: 7.3 %
MONOCYTES ABSOLUTE: 0.4 THOU/MM3 (ref 0.4–1.3)
NUCLEATED RED BLOOD CELLS: 0 /100 WBC
PLATELET # BLD: 213 THOU/MM3 (ref 130–400)
PMV BLD AUTO: 11.8 FL (ref 9.4–12.4)
POTASSIUM SERPL-SCNC: 4.5 MEQ/L (ref 3.5–5.2)
RBC # BLD: 4.69 MILL/MM3 (ref 4.2–5.4)
RHEUMATOID FACTOR: 10 IU/ML (ref 0–13)
SEDIMENTATION RATE, ERYTHROCYTE: 5 MM/HR (ref 0–20)
SEG NEUTROPHILS: 63.8 %
SEGMENTED NEUTROPHILS ABSOLUTE COUNT: 3.3 THOU/MM3 (ref 1.8–7.7)
SODIUM BLD-SCNC: 142 MEQ/L (ref 135–145)
TOTAL PROTEIN: 7.5 G/DL (ref 6.1–8)
WBC # BLD: 5.2 THOU/MM3 (ref 4.8–10.8)

## 2021-01-20 PROCEDURE — G8419 CALC BMI OUT NRM PARAM NOF/U: HCPCS | Performed by: INTERNAL MEDICINE

## 2021-01-20 PROCEDURE — 72100 X-RAY EXAM L-S SPINE 2/3 VWS: CPT

## 2021-01-20 PROCEDURE — 73130 X-RAY EXAM OF HAND: CPT

## 2021-01-20 PROCEDURE — 73630 X-RAY EXAM OF FOOT: CPT

## 2021-01-20 PROCEDURE — G8427 DOCREV CUR MEDS BY ELIG CLIN: HCPCS | Performed by: INTERNAL MEDICINE

## 2021-01-20 PROCEDURE — G8484 FLU IMMUNIZE NO ADMIN: HCPCS | Performed by: INTERNAL MEDICINE

## 2021-01-20 PROCEDURE — 71046 X-RAY EXAM CHEST 2 VIEWS: CPT

## 2021-01-20 PROCEDURE — 99244 OFF/OP CNSLTJ NEW/EST MOD 40: CPT | Performed by: INTERNAL MEDICINE

## 2021-01-20 RX ORDER — PREDNISONE 1 MG/1
TABLET ORAL
Qty: 40 TABLET | Refills: 0 | Status: SHIPPED | OUTPATIENT
Start: 2021-01-20 | End: 2021-02-05

## 2021-01-20 RX ORDER — CLOBETASOL PROPIONATE 0.05 G/100ML
SHAMPOO TOPICAL
Qty: 118 ML | Refills: 0 | Status: SHIPPED | OUTPATIENT
Start: 2021-01-20 | End: 2021-02-22 | Stop reason: SDUPTHER

## 2021-01-20 ASSESSMENT — ENCOUNTER SYMPTOMS
BACK PAIN: 1
DIARRHEA: 1
CONSTIPATION: 0
VOMITING: 0
WHEEZING: 0
SHORTNESS OF BREATH: 0
EYES NEGATIVE: 1
EYE REDNESS: 0
NAUSEA: 0
COUGH: 0
EYE PAIN: 0

## 2021-01-20 NOTE — PROGRESS NOTES
[facility]   Date Of Service: 1/20/2021  Provider: Maulik Vallejo DO  Name: Samantha Spence   MRN: [id number]    Chief Complaint(s)      Chief Complaint   Patient presents with    New Patient     referral per Dr Dwight Heredia           History of Present illness (HPI)    Samantha Spence   is a(n)65 y.o. female with a hx of  has a past medical history of Anemia, Chronic gastritis, Chronic reflux esophagitis, Compression fracture, Depression, Diarrhea, Fracture, Fracture of pelvis ([address] GI bleed, History of colon polyps, Psoriasis, and Weight loss. referred by Lisa Harris MD for evaluation of psoraisis and psoriatic arthritis      Psroasisi started present for years - worsened over the past couple years. Currently affecting the bilatearl elbows, knees, ears, scalp, around the eyes. No currently dermatology f/u. Prior treatment with topical steroids. H/o ulcerative colitis per patient - c-scope by Dr. Coral Roa. Joint pains currently affecting fingers, , nkec, back, hips, knees, ankles,. Sx's present for the past several years with worsening of the pain over the past couple years. Timing: mornings and late evenings after laying down. costant aching  lower back and right hip and intermittent in the other joints. Rafi radicular pain. Aggravating factors : lower ext  / back - worse with prolonged standing. Hands / wrists: increase use. Alleviating: naproxen, cold sholders. Prior treatment - phys therapy for repeated Rt hip dislocation years ago. Occasional swelling of the fingers (PIP joints) , nodules along DIP joitn for years. Reported nail changes - lines, and thickening of toe nails. + dry eyes, intermittent diarrhea. Denies weakness, limited ROm.          -denies Photosenstivity, Rash, dry mouth/dry eyes, oral/nasal sores, Raynaud's, digital ulcerations, skin tightening, renal disease,foamy urination, hematuria, sz's, blood clots, pre-term labor loss, 0.00 packs per day for 0.00 years. She has never used smokeless tobacco. She reports that she does not drink alcohol or use drugs. ALLERGIES     Allergies   Allergen Reactions    Codeine Nausea And Vomiting and Other (See Comments)     headache    Penicillins Rash       CURRENT MEDICATIONS      Current Outpatient Medications:     Clobetasol Propionate 0.05 % SHAM, Apply topoically daily, Disp: 118 mL, Rfl: 0    predniSONE (DELTASONE) 5 MG tablet, Take 4 tablets by mouth daily for 4 days, THEN 3 tablets daily for 4 days, THEN 2 tablets daily for 4 days, THEN 1 tablet daily for 4 days. , Disp: 40 tablet, Rfl: 0    pantoprazole (PROTONIX) 40 MG tablet, Take 1 tablet by mouth twice daily, Disp: 60 tablet, Rfl: 5    diclofenac-Misoprostol (ARTHROTEC 50) 50-0.2 MG per tablet, Take 1 tablet by mouth 2 times daily (Patient not taking: Reported on 1/20/2021), Disp: 60 tablet, Rfl: 1    sucralfate (CARAFATE) 1 GM/10ML suspension, Take 10 mLs by mouth 4 times daily (Patient not taking: Reported on 1/20/2021), Disp: 1200 mL, Rfl: 3    PHYSICAL EXAMINATION / OBJECTIVE     Objective:  BP (!) 140/84 (Site: Left Upper Arm, Position: Sitting, Cuff Size: Medium Adult)   Pulse 76   Ht 5' 7.01\" (1.702 m)   Wt 182 lb 3.2 oz (82.6 kg)   SpO2 99%   BMI 28.53 kg/m²     General Appearance:   alert and oriented to person, place and time well-developed and well nourished  Physch : appropriate affects ,   Head:  Normocephalic and atraumatic  Eyes: No gross abnormalities. ,  PERRL, Sclera nonicteric, conjunctiva non-INJECTED  ENT:  MMM,  no deformities , NO oral/nasal sores, Non-tender sinuses. Neck:  Neck supple, Non-tender, No  mass, thyromegaly,    Lymph:  No cervical  or  supraclavicular lymph node swelling.     Pulmonary/Chest:  CTA bilat. , normal air movement, no respiratory distress  Cardiovascular:  Normal rate, + S1 and S2,  NO murmurs , rubs, gallups,     * edema  :  Deferred   Abd/GI: Deferred   Neurologic:  gait and coordination normal and speech normal  Skin:  erythematous plaque  With scabbing along the scalp. , corners of eyes. Nail pitting right 4th finger, thickening toenail. Bilat. Cyanosis of feet bilat. Extremities:  No clubbing ,     Musculoskeletal:   ROM ,  Strength intact bilat upper and lower extremities   Upper extremities:  SHOULDERS  ,  ELBOWS NT, NS    WRISTS tender & fullness bilat. HANDS/FINGERS tender MCPs right 4,5  Left # 1. PIPs  Bilateral.  DIPs right # 1, 2 , 4, 5  Left # 1. Boggy - PIPs bilat      Lower extremities:  HIPS tender right outer hip, + BRIGIDA.    KNEES tender bilat. Negative bilateral medial/latearl compression, anterior/posterior draw, Dede and patellar grind testing   ANKLES NT, NS   FEET : + tender MTPS bilat. + posterior left heal. + fullness bilat 1st MTP and hallus valggus poppy. Hammer toe left 4th . Spine: +  tender upper traps, lumbar spine, sacral spine. ,  ROM  intact ,  - shober, + left  maite,  -  Occiput to wall , - SLR/Cross SLR.       KEY:  Tender : T  Swelling: S  Non-tender : NT  No swelling: NS             LABS        CBC  No results found for: WBC, RBC, HGB, HCT, MCV, MCH, MCHC, RDW, PLT    CMP  No results found for: CALCIUM, LABALBU, PROT, NA, K, CO2, CL, BUN, CREATININE, ALKPHOS, ALT, AST    HgBA1c: No components found for: HGBA1C    No results found for: TSHFT4, TSH  No results found for: VITD25      No results found for: ANASCRN  No results found for: SSA  No results found for: SSB  No results found for: ANTI-SMITH  No results found for: DSDNAAB   No results found for: ANTIRNP  No results found for: C3, C4  No results found for: CCPAB  No results found for: RF    No components found for: CANCASCRN, APANCASCRN  No results found for: SEDRATE  No results found for: CRP    RADIOLOGY:   XR lumbar spine:   1. No acute process. 2. There is stable levoscoliosis of the lumbar spine.    3. There is a stable compression fracture of the L1 vertebral body with approximately 50-60% loss of height. 4. Stable mild discogenic degenerative changes at T10-11, T12-L1, L1-2, L2-3, L3-4 and L4-5.   5. There is vacuum disc phenomenon at L2-3 and L3-4.       2018  PROCEDURE: XR PELVIS (1-2 VIEWS)       CLINICAL INFORMATION: Strain of lumbar region, initial encounter, Hip strain, right, initial encounter       COMPARISON: No prior study.       TECHNIQUE: A single AP view of the pelvis was obtained       FINDINGS: No fractures seen. Hip joints, sacroiliac joints and bones of the pelvis are intact. There is suggestion of an old healed fracture involving the left ischio pubic synchondrosis. There are moderate degenerative changes in the lower lumbar spine    and mild degenerative changes left SI joint. ASSESSMENT/PLAN    Assessment   Plan     1. Psoriasis    2. Diarrhea, unspecified type    3. Polyarthralgia    4. Chronic midline low back pain without sciatica    5. Inflammatory arthritis      1. Psoriasis  2. Inflammatory arthritis  3. Polyarthralgia. - arthralgia for years , worsening over the past couple years with intermittent swelling of the fingers an Rt ankle. + AM stiffness lasting > 1 hour, improvement with NSAID. Intermittent diarrhea, w/ reported hx of ulcerative colitis, psoriasis. + family hx of psoraisis : father, sister, son. Crohns: sister. Exam with synovitis , + dip nodules , posterior heal spurring.    - suspected PsA based upon exam.    - repeat x-ray lumbar spine - as prior left SI joint with DJD    - hepatitis and TB testing in preparation to start anti-TNF therapy. - clobetasol shampoo provided. - declined dermatology evaluation at this time. - prednisone taper for the active psoriasis and psoraitic arthritis     - CBC Auto Differential; Future  - Comprehensive Metabolic Panel; Future  - Sedimentation Rate; Future  - C-Reactive Protein; Future  - Rheumatoid Factor; Future  - MIKI Screen with Reflex; Future  - Anti SSA;  Future  - Anti SSB; Future  - Cyclic Citrul Peptide Antibody, IgG; Future  - Hepatitis Panel, Acute; Future  - XR CHEST (2 VW); Future  - XR HAND LEFT (MIN 3 VIEWS); Future  - XR HAND RIGHT (MIN 3 VIEWS); Future  - XR FOOT RIGHT (MIN 3 VIEWS); Future  - XR FOOT LEFT (MIN 3 VIEWS); Future  - XR LUMBAR SPINE (2-3 VIEWS); Future  - Quantiferon (R) TB Gold, (Incubated); Future    4. Diarrhea, unspecified type   - request records from Dr. Byrnes Fast     - CBC Auto Differential; Future  - Comprehensive Metabolic Panel; Future  - Sedimentation Rate; Future  - C-Reactive Protein; Future  - Rheumatoid Factor; Future  - MIKI Screen with Reflex; Future  - Anti SSA; Future  - Anti SSB; Future  - Cyclic Citrul Peptide Antibody, IgG; Future  - Hepatitis Panel, Acute; Future  - XR CHEST (2 VW); Future  - XR HAND LEFT (MIN 3 VIEWS); Future  - XR HAND RIGHT (MIN 3 VIEWS); Future  - XR FOOT RIGHT (MIN 3 VIEWS); Future  - XR FOOT LEFT (MIN 3 VIEWS); Future  - XR LUMBAR SPINE (2-3 VIEWS); Future  - Quantiferon (R) TB Gold, (Incubated); Future    4. Chronic midline low back pain without sciatica  5. DDD lumbar spine    - lumbar spine tenderness w/o red flag symptosm. + AM stiffness. Xr 2018 with left SI joint changes. - repeat imaging.    - suspected related to PsA   - CBC Auto Differential; Future  - Comprehensive Metabolic Panel; Future  - Sedimentation Rate; Future  - C-Reactive Protein; Future  - Rheumatoid Factor; Future  - MIKI Screen with Reflex; Future  - Anti SSA; Future  - Anti SSB; Future  - Cyclic Citrul Peptide Antibody, IgG; Future  - Hepatitis Panel, Acute; Future  - XR CHEST (2 VW); Future  - XR HAND LEFT (MIN 3 VIEWS); Future  - XR HAND RIGHT (MIN 3 VIEWS); Future  - XR FOOT RIGHT (MIN 3 VIEWS); Future  - XR FOOT LEFT (MIN 3 VIEWS); Future  - XR LUMBAR SPINE (2-3 VIEWS); Future  - Quantiferon (R) TB Gold, (Incubated); Future                 Return in about 3 months (around 4/20/2021).     Electronically signed by Darlene Traore DO on 1/20/2021 at 11:33 AM    New Prescriptions    CLOBETASOL PROPIONATE 0.05 % SHAM    Apply topoically daily    PREDNISONE (DELTASONE) 5 MG TABLET    Take 4 tablets by mouth daily for 4 days, THEN 3 tablets daily for 4 days, THEN 2 tablets daily for 4 days, THEN 1 tablet daily for 4 days. The risks and benefits of my recommendations, as well as other treatment options, benefits and side effects were discussed with the patient today. Questions were answered. Thank you for allowing me to participate in the care of this patient. Please call if there are any questions.

## 2021-01-21 ENCOUNTER — TELEPHONE (OUTPATIENT)
Dept: RHEUMATOLOGY | Age: 66
End: 2021-01-21

## 2021-01-21 NOTE — TELEPHONE ENCOUNTER
----- Message from Danny Chen DO sent at 1/21/2021  7:35 AM EST -----  The x-ray of the right foot revealed mild degenerative arthritis mainly affecting the first big toe joint (metatarsophalangeal joint). Additionally there is noted erosion within the fifth MTP joint which would correlate with a suspected diagnosis of psoriatic arthritis.   Additionally there was a bone spur noted on the heel

## 2021-01-21 NOTE — TELEPHONE ENCOUNTER
----- Message from Donnel Factor sent at 1/21/2021  7:32 AM EST -----  Lab testing to evaluate the liver, kidneys and blood counts were without significant abnormalities. Evaluation for prior hepatitis exposure was negative.   A few additional tests are pending and was a return you will be notified of the results

## 2021-01-21 NOTE — TELEPHONE ENCOUNTER
----- Message from Ronnie Beaver DO sent at 1/21/2021  7:33 AM EST -----  The x-ray of the lower back revealed mild degenerative arthritis along with bone spurring affecting the lumbar spine. The sacroiliac joints both revealed mild thickening of the bone concerning for possible prior sacroiliitis (inflammation within the sacroiliac joints) and could correlate with the suspected diagnosis of psoriatic arthritis.

## 2021-01-21 NOTE — TELEPHONE ENCOUNTER
----- Message from Mónica Deutsch DO sent at 1/21/2021  7:36 AM EST -----  The x-rays of the hands revealed degenerative arthritis. The left hand did reveal some erosive changes (damage to the bone) which is likely attributed to the suspected diagnosis of psoriatic arthritis    Currently awaiting the testing to evaluate for prior tuberculosis exposure before pursuing anti-TNF therapy such as Humira.

## 2021-01-22 LAB — CYCLIC CITRULLINATED PEPTIDE ANTIBODY IGG: 0.6 U/ML (ref 0–7)

## 2021-01-23 LAB — ANA SCREEN: NORMAL

## 2021-01-24 LAB
QUANTI TB GOLD PLUS: NEGATIVE
QUANTI TB1 MINUS NIL: 0 IU/ML (ref 0–0.34)
QUANTI TB2 MINUS NIL: 0 IU/ML (ref 0–0.34)
QUANTIFERON MITOGEN MINUS NIL: > 10 IU/ML
QUANTIFERON NIL: 0.02 IU/ML

## 2021-01-25 RX ORDER — ADALIMUMAB 40MG/0.4ML
40 KIT SUBCUTANEOUS
Qty: 2 EACH | Refills: 5 | Status: SHIPPED | OUTPATIENT
Start: 2021-01-25 | End: 2021-01-28 | Stop reason: SDUPTHER

## 2021-01-25 NOTE — RESULT ENCOUNTER NOTE
Lab testing to evaluate for tuberculosis exposure was negative. Would like to pursue Humira 40 mg subcu weekly as discussed at appointment. Prescription sent to pharmacy.

## 2021-01-27 ENCOUNTER — TELEPHONE (OUTPATIENT)
Dept: RHEUMATOLOGY | Age: 66
End: 2021-01-27

## 2021-01-27 DIAGNOSIS — L40.50 PSA (PSORIATIC ARTHRITIS) (HCC): ICD-10-CM

## 2021-01-27 DIAGNOSIS — L40.9 PSORIASIS: ICD-10-CM

## 2021-01-27 NOTE — TELEPHONE ENCOUNTER
----- Message from Delilah Moise DO sent at 1/25/2021  5:42 PM EST -----  Lab testing to evaluate for tuberculosis exposure was negative. Would like to pursue Humira 40 mg subcu weekly as discussed at appointment. Prescription sent to pharmacy.

## 2021-01-28 LAB
ANTI SSA: NORMAL
ANTI SSB: 25 AU/ML (ref 0–40)

## 2021-01-28 RX ORDER — ADALIMUMAB 40MG/0.4ML
40 KIT SUBCUTANEOUS
Qty: 2 EACH | Refills: 5 | Status: SHIPPED | OUTPATIENT
Start: 2021-01-28 | End: 2021-07-13

## 2021-02-10 ENCOUNTER — HOSPITAL ENCOUNTER (OUTPATIENT)
Age: 66
Setting detail: SPECIMEN
Discharge: HOME | End: 2021-02-10
Payer: COMMERCIAL

## 2021-02-10 ENCOUNTER — TELEPHONE (OUTPATIENT)
Dept: FAMILY MEDICINE CLINIC | Age: 66
End: 2021-02-10

## 2021-02-10 DIAGNOSIS — N39.0 URINARY TRACT INFECTION WITHOUT HEMATURIA, SITE UNSPECIFIED: Primary | ICD-10-CM

## 2021-02-10 LAB

## 2021-02-10 RX ORDER — SULFAMETHOXAZOLE AND TRIMETHOPRIM 800; 160 MG/1; MG/1
1 TABLET ORAL 2 TIMES DAILY
Qty: 14 TABLET | Refills: 0 | Status: SHIPPED | OUTPATIENT
Start: 2021-02-10 | End: 2021-02-17

## 2021-02-10 NOTE — TELEPHONE ENCOUNTER
Pt is calling back to let me know where to fax the UA order. Please inform pt she can  her RX after UA is given.

## 2021-02-10 NOTE — TELEPHONE ENCOUNTER
Pt called req atb for UTI. Pt is having urgency,frequency,low back pain. Pt stated her roads are to bad in [place] to come in for an appt.     Fabian Hager

## 2021-02-12 ENCOUNTER — TELEPHONE (OUTPATIENT)
Dept: FAMILY MEDICINE CLINIC | Age: 66
End: 2021-02-12

## 2021-02-12 LAB
CULTURE: ABNORMAL
Lab: ABNORMAL
SPECIMEN DESCRIPTION: ABNORMAL

## 2021-02-12 NOTE — TELEPHONE ENCOUNTER
Aminah Reyez at the [facility] lab stated that the UA was sent to [place] to be done. He also stated the sensitivity should be done either later today or tomorrow.

## 2021-02-12 NOTE — TELEPHONE ENCOUNTER
----- Message from Angelito Rondon MD sent at 2/12/2021  3:37 PM EST -----  Assessment at the antibiotic that she is taking Bactrim is sensitive to the organisms that she has in her urine  Continue with Bactrim

## 2021-02-12 NOTE — TELEPHONE ENCOUNTER
Pt called and said that she feels as if the ATB that she is on is not working and her UTI is getting worse. She is wanting to know if the culture is back. She said that the frequency and urgency is getting worse. She was wanting to know if something different could be called in. Also there are only preliminary results back.      Please call pt at:    [phone]

## 2021-02-22 DIAGNOSIS — L40.9 PSORIASIS: ICD-10-CM

## 2021-02-22 RX ORDER — CLOBETASOL PROPIONATE 0.05 G/100ML
SHAMPOO TOPICAL
Qty: 118 ML | Refills: 0 | Status: SHIPPED | OUTPATIENT
Start: 2021-02-22 | End: 2021-04-26 | Stop reason: SDUPTHER

## 2021-02-22 NOTE — TELEPHONE ENCOUNTER
Sherin Murcia called requesting a refill on the following medications:  Requested Prescriptions     Pending Prescriptions Disp Refills    Clobetasol Propionate 0.05 % SHAM 118 mL 0     Sig: Apply topoically daily     Pharmacy verified: [facility]  . pv      Date of last visit: 1/20/2021  Date of next visit (if applicable): 0/30/7208

## 2021-04-26 ENCOUNTER — OFFICE VISIT (OUTPATIENT)
Dept: RHEUMATOLOGY | Age: 66
End: 2021-04-26
Payer: COMMERCIAL

## 2021-04-26 VITALS
BODY MASS INDEX: 27.84 KG/M2 | HEART RATE: 73 BPM | HEIGHT: 67 IN | SYSTOLIC BLOOD PRESSURE: 134 MMHG | OXYGEN SATURATION: 92 % | DIASTOLIC BLOOD PRESSURE: 88 MMHG | WEIGHT: 177.4 LBS

## 2021-04-26 DIAGNOSIS — Z51.81 MEDICATION MONITORING ENCOUNTER: ICD-10-CM

## 2021-04-26 DIAGNOSIS — G89.29 CHRONIC MIDLINE LOW BACK PAIN WITHOUT SCIATICA: ICD-10-CM

## 2021-04-26 DIAGNOSIS — L40.9 PSORIASIS: ICD-10-CM

## 2021-04-26 DIAGNOSIS — M54.50 CHRONIC MIDLINE LOW BACK PAIN WITHOUT SCIATICA: ICD-10-CM

## 2021-04-26 DIAGNOSIS — L40.50 PSA (PSORIATIC ARTHRITIS) (HCC): Primary | ICD-10-CM

## 2021-04-26 PROCEDURE — G8419 CALC BMI OUT NRM PARAM NOF/U: HCPCS | Performed by: INTERNAL MEDICINE

## 2021-04-26 PROCEDURE — G8427 DOCREV CUR MEDS BY ELIG CLIN: HCPCS | Performed by: INTERNAL MEDICINE

## 2021-04-26 PROCEDURE — 3017F COLORECTAL CA SCREEN DOC REV: CPT | Performed by: INTERNAL MEDICINE

## 2021-04-26 PROCEDURE — 1036F TOBACCO NON-USER: CPT | Performed by: INTERNAL MEDICINE

## 2021-04-26 PROCEDURE — 99214 OFFICE O/P EST MOD 30 MIN: CPT | Performed by: INTERNAL MEDICINE

## 2021-04-26 RX ORDER — NAPROXEN 375 MG/1
375 TABLET ORAL 2 TIMES DAILY PRN
Qty: 180 TABLET | Refills: 1 | Status: SHIPPED | OUTPATIENT
Start: 2021-04-26 | End: 2022-10-27 | Stop reason: ALTCHOICE

## 2021-04-26 RX ORDER — CLOBETASOL PROPIONATE 0.05 G/100ML
SHAMPOO TOPICAL
Qty: 118 ML | Refills: 2 | Status: SHIPPED | OUTPATIENT
Start: 2021-04-26 | End: 2022-10-27 | Stop reason: ALTCHOICE

## 2021-04-26 RX ORDER — FOLIC ACID 1 MG/1
1 TABLET ORAL DAILY
Qty: 90 TABLET | Refills: 1 | Status: SHIPPED | OUTPATIENT
Start: 2021-04-26 | End: 2021-07-09 | Stop reason: SDUPTHER

## 2021-04-26 ASSESSMENT — ENCOUNTER SYMPTOMS
VOMITING: 0
COUGH: 0
DIARRHEA: 0
EYES NEGATIVE: 1
EYE REDNESS: 0
WHEEZING: 0
EYE PAIN: 0
NAUSEA: 0
CONSTIPATION: 0
SHORTNESS OF BREATH: 0

## 2021-04-26 NOTE — PROGRESS NOTES
[facility] RHEUMATOLOGY FOLLOW UP NOTE       Date Of Service: 4/26/2021  Provider: Bulmaro Church DO  PCP: Oleg Mahmood MD   Name: Wenceslao Ford   MRN: [id number]        History of Present Illness (HPI)     Chief Complaint   Patient presents with    3 Month Follow-Up        Wenceslao Ford  is a(n)66 y.o. female with a hx of Anemia, Chronic gastritis, Chronic reflux esophagitis, Compression fracture, Depression, Diarrhea, Fracture, Fracture of pelvis ([address] GI bleed, History of colon polyps, Psoriasis, and Weight loss. psoraisis and psoriatic arthritis      Interval hx: humira started 3 months ago - not lasting the entier 2 weeks. Joint pains returning a few days prior to the next injection. Continued psoriasis and intermittent flares of the psoraisis of hte elbows. Psoriasis  - active in scalp, left elbow and around the eyes. Nails unchanged. Psoriasis - intermittent flares. Joint pains currently bilat hand, wrists, left elbows, right hip, bilat knee, left ankle, neck and lower back. Pain up to 4/10 over the past week. Aggravating factors : lower ext  / back - worse with prolonged standing. Hands / wrists: increase use. Alleviating: naproxen, cold sholders. AM stiuffness lasting about 30 min. Swelling of finger last week. + dry eyes and dry mouth. REVIEW OF SYSTEMS: (ROS)    Review of Systems   Constitutional: Negative for diaphoresis, fatigue and fever. HENT: Negative for congestion, hearing loss and nosebleeds. Eyes: Negative. Negative for pain and redness. Respiratory: Negative for cough, shortness of breath and wheezing. Cardiovascular: Negative. Negative for chest pain. Gastrointestinal: Negative for constipation, diarrhea, nausea and vomiting. Genitourinary: Negative for difficulty urinating, frequency and hematuria. Musculoskeletal: Negative for myalgias. Skin: Negative for rash. Neurological: Negative for dizziness, weakness and headaches. Hematological: Does not bruise/bleed easily. Psychiatric/Behavioral: Negative for sleep disturbance. The patient is not nervous/anxious. PAST MEDICAL HISTORY     has a past medical history of Anemia, Chronic gastritis, Chronic reflux esophagitis, Compression fracture, Depression, Diarrhea, Fracture, Fracture of pelvis ([address] GI bleed, History of colon polyps, Psoriasis, and Weight loss. SOCIAL HISTORY     reports that she quit smoking about 19 years ago. Her smoking use included cigarettes. She smoked 0.00 packs per day for 0.00 years. She has never used smokeless tobacco. She reports that she does not drink alcohol or use drugs. ALLERGIES     Allergies   Allergen Reactions    Codeine Nausea And Vomiting and Other (See Comments)     headache    Penicillins Rash       CURRENT MEDICATIONS      Current Outpatient Medications:     Clobetasol Propionate 0.05 % SHAM, Apply topoically daily, Disp: 118 mL, Rfl: 0    Adalimumab (HUMIRA PEN) 40 MG/0.4ML PNKT, Inject 40 mg into the skin every 14 days, Disp: 2 each, Rfl: 5    pantoprazole (PROTONIX) 40 MG tablet, Take 1 tablet by mouth twice daily, Disp: 60 tablet, Rfl: 5    sucralfate (CARAFATE) 1 GM/10ML suspension, Take 10 mLs by mouth 4 times daily, Disp: 1200 mL, Rfl: 3    diclofenac-Misoprostol (ARTHROTEC 50) 50-0.2 MG per tablet, Take 1 tablet by mouth 2 times daily (Patient not taking: Reported on 1/20/2021), Disp: 60 tablet, Rfl: 1    PHYSICAL EXAMINATION / OBJECTIVE     Objective:  /88 (Site: Left Upper Arm, Position: Sitting, Cuff Size: Medium Adult)   Pulse 73   Ht 5' 7.01\" (1.702 m)   Wt 177 lb 6.4 oz (80.5 kg)   SpO2 92%   BMI 27.78 kg/m²     Physical Exam      General Appearance: General appearance:  AAO x 3 ,  well-developed and well nourished  Head: NCAT  Eyes: No abnormalities. ,  Sclera non-icteric,   Ears / Nose:  normal  appearance  ears and nose. No active drainage from nose.    Mouth:  MMM, ears w/o deformities  Neck: found for: ANTIRNP  No results found for: C3, C4  No results found for: CCPAB  Lab Results   Component Value Date    RF 10 2021           RADIOLOGY / PROCEDURES:     ASSESSMENT/PLAN:     1. PSA (psoriatic arthritis) ([facility] [address]    2. Psoriasis    3. Chronic midline low back pain without sciatica    4. Medication monitoring encounter      Psoriatic arthritis. - active w/ rapid 3 : 9, cont. Flares. arthralgia for years , worsening over the past couple years with intermittent swelling of the fingers an Rt ankle. + AM stiffness lasting > 1 hour, improvement with NSAID. Intermittent diarrhea, w/ reported hx of ulcerative colitis, psoriasis. + family hx of psoraisis : father, sister, son. Crohns: sister. Exam with synovitis , + dip nodules , posterior heal spurring.    -  humira 40mg q 2 weeks - 2021.    -  START - Methotrexate 10 mg po weekly   + folic acid 1 mg daily Dosin.5- 25mg SC or PO weekly (>87=36 mg) + folic acid Inhibits DNA synthesis, repair, and replication, decreasing inflammation by suppression adenosine levels   Side effects include but ar not limited to : abd pain, nausea, diarrhea, fatigue, low blood counts, liver injury, increased risk of infection. RARE: nodulosis, increased risk of lymphoma and non melanoma skin cancer, pulmonary scaring/inflammation. Reversal: high dose leucovorin    -  Start naproxen 375mg twice daily. - The patient was advised that NSAID-type medications have two very important potential side effects: gastrointestinal irritation including hemorrhage and renal injuries. She was asked to take the medication with food and to stop if she experiences any GI upset. I asked her to call for vomiting, abdominal pain or black/bloody stools. The patient expresses understanding of these issues and questions were answered. On protonix. Psoriasis - active scalp, left elbow. ---  Clobetasol shampoo - cont. Osteoarthritis bilat hand, feet. - active pain. - cont.  Naproxen 220mg twice daily prn     Medication monitoring: q 4 weeks w/ Methotrexate start 4/26/2021     Return in about 2 months (around 6/26/2021) for PsA. New Prescriptions    No medications on file       4/26/2021    Electronically signed by Alyssa Flores, DO on 4/26/21 at 2:32 PM EDT  Please contact the office if you have any questions or change of symptoms.

## 2021-05-13 ENCOUNTER — TELEPHONE (OUTPATIENT)
Dept: RHEUMATOLOGY | Age: 66
End: 2021-05-13

## 2021-05-13 NOTE — TELEPHONE ENCOUNTER
Patient calling in c/o side effects of her MTX. She has been having some GI issues since starting. She has been having \"explosive diarrhea\" about 5 times per day since starting. Advised to stop the MTX and monitor her symptoms and call once they have resolved or if they do not for further instructions.      Please advise next step

## 2021-05-13 NOTE — TELEPHONE ENCOUNTER
Please call the office in a week to let us know if the  symptoms improved after stopping the methotrexate and we can discuss alternate treatment options at that time.

## 2021-06-21 ENCOUNTER — TELEPHONE (OUTPATIENT)
Dept: RHEUMATOLOGY | Age: 66
End: 2021-06-21

## 2021-06-28 ENCOUNTER — OFFICE VISIT (OUTPATIENT)
Dept: RHEUMATOLOGY | Age: 66
End: 2021-06-28
Payer: COMMERCIAL

## 2021-06-28 VITALS
SYSTOLIC BLOOD PRESSURE: 136 MMHG | WEIGHT: 170 LBS | BODY MASS INDEX: 25.76 KG/M2 | DIASTOLIC BLOOD PRESSURE: 72 MMHG | HEIGHT: 68 IN | OXYGEN SATURATION: 99 % | HEART RATE: 67 BPM

## 2021-06-28 DIAGNOSIS — Z51.81 MEDICATION MONITORING ENCOUNTER: Primary | ICD-10-CM

## 2021-06-28 PROCEDURE — G8427 DOCREV CUR MEDS BY ELIG CLIN: HCPCS | Performed by: NURSE PRACTITIONER

## 2021-06-28 PROCEDURE — G8419 CALC BMI OUT NRM PARAM NOF/U: HCPCS | Performed by: NURSE PRACTITIONER

## 2021-06-28 PROCEDURE — 99214 OFFICE O/P EST MOD 30 MIN: CPT | Performed by: NURSE PRACTITIONER

## 2021-06-28 PROCEDURE — 1036F TOBACCO NON-USER: CPT | Performed by: NURSE PRACTITIONER

## 2021-06-28 PROCEDURE — 3017F COLORECTAL CA SCREEN DOC REV: CPT | Performed by: NURSE PRACTITIONER

## 2021-06-28 RX ORDER — PREDNISONE 10 MG/1
TABLET ORAL
Qty: 30 TABLET | Refills: 0 | Status: SHIPPED | OUTPATIENT
Start: 2021-06-28 | End: 2021-07-13

## 2021-06-28 ASSESSMENT — ENCOUNTER SYMPTOMS
DIARRHEA: 0
CONSTIPATION: 0
COUGH: 0
ABDOMINAL PAIN: 0
BACK PAIN: 1
TROUBLE SWALLOWING: 0
EYE PAIN: 0
EYE ITCHING: 0
NAUSEA: 0
SHORTNESS OF BREATH: 0

## 2021-06-28 NOTE — PROGRESS NOTES
[facility] RHEUMATOLOGY FOLLOW UP NOTE       Date Of Service: 6/28/2021  Provider: Sanjeev Reeves, APRN - CNP    Name: Cary Valerio   MRN: [id number]    Chief Complaint(s)     Chief Complaint   Patient presents with    Follow-up     2 month f/u PSA        History of Present Illness (HPI)     Cary Valerio  is a(n)66 y.o. female with a hx of anemia, chronic gastritis, reflux esophagitis, compression fracture, depression, diarrhea, pelvic fracture, GI bleed, hx of colon polyps, psoriasis, wt loss here for the f/u evaluation of psoriasis arthritis, psoriasis     Interval hx:    - GI upset with methotrexate, patient now taking   - psoriasis worsened last week    pain affecting the right fingers, right wrist, right hip, right knees, right ankle, back  Pain on a scale 0-10: 5/10  Type of pain: intermittent  Timing: mornings  Aggravating factors: lower ext/back: worse with prolonged standing. Hands/wrists: increased use  Alleviating factors: n/a    Associated symptoms:  denies swelling/  Redness/ warmth, + AM stiffness lasting ~ 30-45 minutes    + psoriasis behind left ear, scalp, left elbow, knees, left foot    REVIEW OF SYSTEMS: (ROS)    Review of Systems   Constitutional: Negative for fatigue, fever and unexpected weight change. HENT: Negative for congestion and trouble swallowing. Hair loss   Eyes: Negative for pain and itching. Dry eyes   Respiratory: Negative for cough and shortness of breath. Cardiovascular: Negative for chest pain and leg swelling. Gastrointestinal: Negative for abdominal pain, constipation, diarrhea and nausea. Endocrine: Negative for cold intolerance and heat intolerance. Genitourinary: Negative for difficulty urinating, frequency and urgency. Musculoskeletal: Positive for arthralgias, back pain and neck pain. Negative for joint swelling. Skin: Positive for rash. Neurological: Negative for dizziness, weakness, numbness and headaches. Psychiatric/Behavioral: The patient is not nervous/anxious.         PAST MEDICAL HISTORY      Past Medical History:   Diagnosis Date    Anemia 4/3/14    Chronic gastritis 4/22/14    Chronic reflux esophagitis 4/22/14    Compression fracture     Depression     Diarrhea     Fracture     compression    Fracture of pelvis ([address] 2008    GI bleed     History of colon polyps     Psoriasis     scalp    Weight loss        PAST SURGICAL HISTORY      Past Surgical History:   Procedure Laterality Date    CARPAL TUNNEL RELEASE      bilateral    COLONOSCOPY  2010    DILATION AND CURETTAGE OF UTERUS      OTHER SURGICAL HISTORY  July 2014    \"nodes\" removed from left armpit - Dr. Hernández Enter History   Problem Relation Age of Onset    Heart Disease Mother     High Blood Pressure Mother     Psoriasis Father     Colon Cancer Sister     Psoriasis Sister     Crohn's Disease Sister     Crohn's Disease Sister        SOCIAL HISTORY      Social History     Tobacco History     Smoking Status  Former Smoker Quit date  9/26/2001 Smoking Frequency  0 packs/day for 0 years (0 pk yrs) Smoking Tobacco Type  Cigarettes    Smokeless Tobacco Use  Never Used          Alcohol History     Alcohol Use Status  No          Drug Use     Drug Use Status  No          Sexual Activity     Sexually Active  Never                ALLERGIES     Allergies   Allergen Reactions    Codeine Nausea And Vomiting and Other (See Comments)     headache    Penicillins Rash       CURRENT MEDICATIONS      Current Outpatient Medications   Medication Sig Dispense Refill    naproxen (NAPROSYN) 375 MG tablet Take 1 tablet by mouth 2 times daily as needed for Pain 180 tablet 1    Clobetasol Propionate 0.05 % SHAM Apply topoically daily 118 mL 2    Adalimumab (HUMIRA PEN) 40 MG/0.4ML PNKT Inject 40 mg into the skin every 14 days 2 each 5    pantoprazole (PROTONIX) 40 MG tablet Take 1 tablet by mouth twice daily 60 tablet 5    sucralfate (CARAFATE) 1 GM/10ML suspension Take 10 mLs by mouth 4 times daily 1200 mL 3    methotrexate (RHEUMATREX) 2.5 MG chemo tablet Take 4 tablets by mouth once a week (Patient not taking: Reported on 6/28/2021) 16 tablet 0    folic acid (FOLVITE) 1 MG tablet Take 1 tablet by mouth daily (Patient not taking: Reported on 6/28/2021) 90 tablet 1    diclofenac-Misoprostol (ARTHROTEC 50) 50-0.2 MG per tablet Take 1 tablet by mouth 2 times daily (Patient not taking: Reported on 1/20/2021) 60 tablet 1     No current facility-administered medications for this visit. PHYSICAL EXAMINATION / OBJECTIVE   Objective:  /72 (Site: Left Upper Arm, Position: Sitting, Cuff Size: Medium Adult)   Pulse 67   Ht 5' 7.99\" (1.727 m)   Wt 170 lb (77.1 kg)   SpO2 99%   BMI 25.85 kg/m²     Physical Exam  Vitals reviewed. Constitutional:       Appearance: She is well-developed. Cardiovascular:      Rate and Rhythm: Normal rate and regular rhythm. Pulmonary:      Effort: Pulmonary effort is normal.      Breath sounds: Normal breath sounds. Abdominal:      Palpations: Abdomen is soft. Tenderness: There is no abdominal tenderness. Musculoskeletal:      Cervical back: Normal range of motion and neck supple. Skin:     General: Skin is warm and dry. Findings: Rash (plaques scalp, elbows, knees, left foot) present. Neurological:      Mental Status: She is alert and oriented to person, place, and time. Deep Tendon Reflexes: Reflexes are normal and symmetric. Psychiatric:         Thought Content:  Thought content normal.       Upper extremities:    SHOULDERS nontender, no swelling ,   ELBOWS nontender, no swelling,   WRISTS + tender right  HANDS/FINGERS nontender, no swelling    Lower extremities:  HIPS nontender  KNEES + tender and warmth/swelling bilat R>L  ANKLES + tender bilat   FEET : + MTP compression and bogginess bilat    LABS    CBC  Lab Results   Component Value Date    WBC 5.2 01/20/2021 RBC 4.69 01/20/2021    HGB 14.3 01/20/2021    HCT 44.9 01/20/2021    MCV 95.7 01/20/2021    MCH 30.5 01/20/2021    MCHC 31.8 01/20/2021     01/20/2021       CMP  Lab Results   Component Value Date    CALCIUM 9.3 01/20/2021    LABALBU 4.6 01/20/2021    PROT 7.5 01/20/2021     01/20/2021    K 4.5 01/20/2021    CO2 26 01/20/2021     01/20/2021    BUN 14 01/20/2021    CREATININE 0.7 01/20/2021    ALKPHOS 82 01/20/2021    ALT 12 01/20/2021    AST 19 01/20/2021       HgBA1c: No components found for: HGBA1C    No results found for: VITD25      Lab Results   Component Value Date    ANASCRN None Detected 01/20/2021     Lab Results   Component Value Date    SSA SEE BELOW 01/20/2021     Lab Results   Component Value Date    SSB 25 01/20/2021     No results found for: ANTI-SMITH  No results found for: DSDNAAB   No results found for: ANTIRNP  No results found for: C3, C4  No results found for: CCPAB  Lab Results   Component Value Date    RF 10 01/20/2021       No components found for: CANCASCRN, APANCASCRN  Lab Results   Component Value Date    SEDRATE 5 01/20/2021     Lab Results   Component Value Date    CRP 0.28 01/20/2021       RADIOLOGY:         ASSESSMENT/PLAN    Assessment   Plan     Psoriatic arthritis  - arthralgia for years, + prolonged AM stiffness, improvement with NSAIDs. Intermittent diarrhea w/ reported hx of ulcerative colitis. Psoriasis. + fmhx of psoriasis: father, sister, son. Crohns in sister. Exam with synovitis, DIP nodules, posterior heel spurring   - humira 40 mg subcu q 2 weeks (feb 2021)   - switch methotrexate to 10 mg subcu weekly d/t GI upset with oral.    - folic acid 1 mg daily   - naproxen 375 mg BID    Psoriasis- active posterior ears, scalp, elbows, knees, left foot    Chronic midline low back pain without sciatica  - xray lumbar spine w/ bone spurring, SI joint sclerosis.      Osteoarthritis bilat hands and feet  - - continue naproxen 220 mg BID PRN    Medication monitoring   - CBC, CMP, sed rate, CRP q 4 weeks x3 w/ MTX start      No follow-ups on file. Electronically signed by JUAN DAVID Manzano CNP on 6/28/2021 at 12:10 PM    New Prescriptions    No medications on file       Thank you for allowing me to participate in the care of this patient. Please call if there are any questions.

## 2021-07-08 ENCOUNTER — HOSPITAL ENCOUNTER (OUTPATIENT)
Age: 66
Discharge: HOME | End: 2021-07-08
Payer: COMMERCIAL

## 2021-07-08 ENCOUNTER — HOSPITAL ENCOUNTER (EMERGENCY)
Age: 66
Discharge: HOME | End: 2021-07-08
Payer: COMMERCIAL

## 2021-07-08 VITALS
OXYGEN SATURATION: 98 % | DIASTOLIC BLOOD PRESSURE: 67 MMHG | SYSTOLIC BLOOD PRESSURE: 145 MMHG | HEIGHT: 67 IN | WEIGHT: 170 LBS | RESPIRATION RATE: 16 BRPM | TEMPERATURE: 98.3 F | BODY MASS INDEX: 26.68 KG/M2 | HEART RATE: 90 BPM

## 2021-07-08 DIAGNOSIS — M25.462 KNEE EFFUSION, LEFT: Primary | ICD-10-CM

## 2021-07-08 DIAGNOSIS — Z51.81 MEDICATION MONITORING ENCOUNTER: ICD-10-CM

## 2021-07-08 LAB
ALBUMIN SERPL-MCNC: 4.2 G/DL (ref 3.5–5.1)
ALP BLD-CCNC: 101 U/L (ref 38–126)
ALT SERPL-CCNC: 16 U/L (ref 11–66)
ANION GAP SERPL CALCULATED.3IONS-SCNC: 12 MEQ/L (ref 8–16)
AST SERPL-CCNC: 15 U/L (ref 5–40)
BASOPHILS # BLD: 0.3 %
BASOPHILS ABSOLUTE: 0 THOU/MM3 (ref 0–0.1)
BILIRUB SERPL-MCNC: 0.8 MG/DL (ref 0.3–1.2)
BUN BLDV-MCNC: 15 MG/DL (ref 7–22)
C-REACTIVE PROTEIN: 9.86 MG/DL (ref 0–1)
CALCIUM SERPL-MCNC: 9.5 MG/DL (ref 8.5–10.5)
CHLORIDE BLD-SCNC: 103 MEQ/L (ref 98–111)
CO2: 26 MEQ/L (ref 23–33)
CREAT SERPL-MCNC: 0.8 MG/DL (ref 0.4–1.2)
EOSINOPHIL # BLD: 0.7 %
EOSINOPHILS ABSOLUTE: 0.1 THOU/MM3 (ref 0–0.4)
ERYTHROCYTE [DISTWIDTH] IN BLOOD BY AUTOMATED COUNT: 14.2 % (ref 11.5–14.5)
ERYTHROCYTE [DISTWIDTH] IN BLOOD BY AUTOMATED COUNT: 51.4 FL (ref 35–45)
GFR SERPL CREATININE-BSD FRML MDRD: 72 ML/MIN/1.73M2
GLUCOSE BLD-MCNC: 109 MG/DL (ref 70–108)
HCT VFR BLD CALC: 43.9 % (ref 37–47)
HEMOGLOBIN: 13.4 GM/DL (ref 12–16)
IMMATURE GRANS (ABS): 0.05 THOU/MM3 (ref 0–0.07)
IMMATURE GRANULOCYTES: 0.6 %
LYMPHOCYTES # BLD: 14.4 %
LYMPHOCYTES ABSOLUTE: 1.3 THOU/MM3 (ref 1–4.8)
MCH RBC QN AUTO: 30.1 PG (ref 26–33)
MCHC RBC AUTO-ENTMCNC: 30.5 GM/DL (ref 32.2–35.5)
MCV RBC AUTO: 98.7 FL (ref 81–99)
MONOCYTES # BLD: 8.7 %
MONOCYTES ABSOLUTE: 0.8 THOU/MM3 (ref 0.4–1.3)
NUCLEATED RED BLOOD CELLS: 0 /100 WBC
PLATELET # BLD: 207 THOU/MM3 (ref 130–400)
PMV BLD AUTO: 12.6 FL (ref 9.4–12.4)
POTASSIUM SERPL-SCNC: 4.5 MEQ/L (ref 3.5–5.2)
RBC # BLD: 4.45 MILL/MM3 (ref 4.2–5.4)
SEDIMENTATION RATE, ERYTHROCYTE: 15 MM/HR (ref 0–20)
SEG NEUTROPHILS: 75.3 %
SEGMENTED NEUTROPHILS ABSOLUTE COUNT: 6.8 THOU/MM3 (ref 1.8–7.7)
SODIUM BLD-SCNC: 141 MEQ/L (ref 135–145)
TOTAL PROTEIN: 6.9 G/DL (ref 6.1–8)
WBC # BLD: 9 THOU/MM3 (ref 4.8–10.8)

## 2021-07-08 PROCEDURE — 36415 COLL VENOUS BLD VENIPUNCTURE: CPT

## 2021-07-08 PROCEDURE — 85651 RBC SED RATE NONAUTOMATED: CPT

## 2021-07-08 PROCEDURE — 99213 OFFICE O/P EST LOW 20 MIN: CPT | Performed by: NURSE PRACTITIONER

## 2021-07-08 PROCEDURE — 80053 COMPREHEN METABOLIC PANEL: CPT

## 2021-07-08 PROCEDURE — 86140 C-REACTIVE PROTEIN: CPT

## 2021-07-08 PROCEDURE — 85025 COMPLETE CBC W/AUTO DIFF WBC: CPT

## 2021-07-08 PROCEDURE — 99213 OFFICE O/P EST LOW 20 MIN: CPT

## 2021-07-08 RX ORDER — CLINDAMYCIN HYDROCHLORIDE 300 MG/1
600 CAPSULE ORAL 3 TIMES DAILY
Qty: 84 CAPSULE | Refills: 0 | Status: SHIPPED | OUTPATIENT
Start: 2021-07-08 | End: 2021-07-22

## 2021-07-08 RX ORDER — NAPROXEN 500 MG/1
500 TABLET ORAL 2 TIMES DAILY WITH MEALS
Qty: 14 TABLET | Refills: 0 | Status: SHIPPED | OUTPATIENT
Start: 2021-07-08 | End: 2021-08-10

## 2021-07-08 ASSESSMENT — ENCOUNTER SYMPTOMS
NAUSEA: 0
CHEST TIGHTNESS: 0
RHINORRHEA: 0
SHORTNESS OF BREATH: 0
DIARRHEA: 0
COUGH: 0
VOMITING: 0
SORE THROAT: 0

## 2021-07-08 ASSESSMENT — PAIN DESCRIPTION - ORIENTATION: ORIENTATION: LEFT

## 2021-07-08 ASSESSMENT — PAIN SCALES - GENERAL: PAINLEVEL_OUTOF10: 6

## 2021-07-08 ASSESSMENT — PAIN DESCRIPTION - LOCATION: LOCATION: KNEE

## 2021-07-08 NOTE — ED PROVIDER NOTES
[facility]  Urgent Care Encounter       CHIEF COMPLAINT       Chief Complaint   Patient presents with    Knee Pain     c/o red swelling and painful. \" Val Roof been moving and cleaning on my hands and knees  and pulling weeds on my hands knees\"       Nurses Notes reviewed and I agree except as noted in the HPI. HISTORY OF PRESENT ILLNESS   Yuri Franco is a 61 y.o. female who presents to the [facility] urgent care for evaluation of left knee pain and erythema. She reports over the last week being on her hands and knees more frequently. She reports the pain and irritation started Tuesday. She currently rates the pain 6 out of 10. She describes it as pulling or tight. She reports ice and elevating as alleviating factors. She reports aggravating factors as walking on it. She does have history of arthritis. She reports this does not feel similar. She is finishing a 15-day course of prednisone prescribed by her rheumatologist.  She also takes a low-dose naproxen twice daily. Knee is edematous with tenderness and erythema. The history is provided by the patient. No  was used. REVIEW OF SYSTEMS     Review of Systems   Constitutional: Negative for activity change, appetite change, chills, fatigue and fever. HENT: Negative for ear discharge, ear pain, rhinorrhea and sore throat. Respiratory: Negative for cough, chest tightness and shortness of breath. Cardiovascular: Negative for chest pain. Gastrointestinal: Negative for diarrhea, nausea and vomiting. Genitourinary: Negative for dysuria. Musculoskeletal: Positive for arthralgias. Skin: Negative for rash. Allergic/Immunologic: Negative for environmental allergies and food allergies. Neurological: Negative for dizziness and headaches.        PAST MEDICAL HISTORY         Diagnosis Date    Anemia 4/3/14    Chronic gastritis 4/22/14    Chronic reflux esophagitis 4/22/14    Compression fracture     Depression     Diarrhea     Fracture     compression    Fracture of pelvis ([address] 2008    GI bleed     History of colon polyps     Psoriasis     scalp    Weight loss        SURGICALHISTORY     Patient  has a past surgical history that includes Dilation and curettage of uterus; Carpal tunnel release; Colonoscopy (2010); and other surgical history (July 2014). CURRENT MEDICATIONS       Discharge Medication List as of 7/8/2021 10:04 AM      CONTINUE these medications which have NOT CHANGED    Details   predniSONE (DELTASONE) 10 MG tablet Take 3 tablets by mouth daily for 5 days, THEN 2 tablets daily for 5 days, THEN 1 tablet daily for 5 days. , Disp-30 tablet, R-0Normal      !! naproxen (NAPROSYN) 375 MG tablet Take 1 tablet by mouth 2 times daily as needed for Pain, Disp-180 tablet, R-1Normal      Clobetasol Propionate 0.05 % SHAM Apply topoically daily, Disp-118 mL, R-2Normal      Adalimumab (HUMIRA PEN) 40 MG/0.4ML PNKT Inject 40 mg into the skin every 14 days, Disp-2 each, R-5Normal      pantoprazole (PROTONIX) 40 MG tablet Take 1 tablet by mouth twice daily, Disp-60 tablet, R-5Normal      Tuberculin-Allergy Syringes 28G X 1/2\" 1 ML MISC WEEKLY Starting Mon 6/28/2021, Disp-12 each, R-3, Normal      methotrexate (RHEUMATREX) 2.5 MG chemo tablet Take 4 tablets by mouth once a week, Disp-16 tablet, [id number]      folic acid (FOLVITE) 1 MG tablet Take 1 tablet by mouth daily, Disp-90 tablet, R-1Normal      diclofenac-Misoprostol (ARTHROTEC 50) 50-0.2 MG per tablet Take 1 tablet by mouth 2 times daily, Disp-60 tablet, R-1Normal      sucralfate (CARAFATE) 1 GM/10ML suspension Take 10 mLs by mouth 4 times daily, Disp-1200 mL, R-3Normal       !! - Potential duplicate medications found. Please discuss with provider. ALLERGIES     Patient is is allergic to codeine and penicillins.     Patients   Immunization History   Administered Date(s) Administered    Influenza Vaccine, unspecified formulation 10/03/2016    Influenza Virus Vaccine 09/30/2014    Influenza, Essie Greyson, IM, PF (6 mo and older Fluzone, Flulaval, Fluarix, and 3 yrs and older Afluria) 09/12/2018       FAMILY HISTORY     Patient's family history includes Colon Cancer in her sister; Crohn's Disease in her sister and sister; Heart Disease in her mother; High Blood Pressure in her mother; Psoriasis in her father and sister. SOCIAL HISTORY     Patient  reports that she quit smoking about 19 years ago. Her smoking use included cigarettes. She smoked 0.00 packs per day for 0.00 years. She has never used smokeless tobacco. She reports previous alcohol use. She reports that she does not use drugs. PHYSICAL EXAM     ED TRIAGE VITALS  BP: (!) 145/67, Temp: 98.3 °F (36.8 °C), Pulse: 90, Resp: 16, SpO2: 98 %,Estimated body mass index is 26.63 kg/m² as calculated from the following:    Height as of this encounter: 5' 7\" (1.702 m). Weight as of this encounter: 170 lb (77.1 kg). ,No LMP recorded. Patient is postmenopausal.    Physical Exam  Vitals and nursing note reviewed. Constitutional:       General: She is not in acute distress. Appearance: Normal appearance. She is not ill-appearing, toxic-appearing or diaphoretic. HENT:      Head: Normocephalic. Right Ear: Ear canal and external ear normal.      Left Ear: Ear canal and external ear normal.      Nose: Nose normal. No congestion or rhinorrhea. Mouth/Throat:      Mouth: Mucous membranes are moist.      Pharynx: Oropharynx is clear. No oropharyngeal exudate or posterior oropharyngeal erythema. Cardiovascular:      Rate and Rhythm: Normal rate. Pulses: Normal pulses. Pulmonary:      Effort: Pulmonary effort is normal. No respiratory distress. Breath sounds: No stridor. No wheezing or rhonchi. Abdominal:      General: Abdomen is flat. Bowel sounds are normal.      Palpations: Abdomen is soft. Musculoskeletal:         General: No swelling or tenderness. Normal range of motion. Cervical back: Normal range of motion. Legs:       Comments: Noted anterior erythema and edema to left knee. Neurological:      General: No focal deficit present. Mental Status: She is alert and oriented to person, place, and time. Psychiatric:         Mood and Affect: Mood normal.         Behavior: Behavior normal.         DIAGNOSTIC RESULTS     Labs:No results found for this visit on 07/08/21. IMAGING:    No orders to display         EKG: None      URGENT CARE COURSE:     Vitals:    07/08/21 0939   BP: (!) 145/67   Pulse: 90   Resp: 16   Temp: 98.3 °F (36.8 °C)   TempSrc: Temporal   SpO2: 98%   Weight: 170 lb (77.1 kg)   Height: 5' 7\" (1.702 m)       Medications - No data to display         PROCEDURES:  None    FINAL IMPRESSION      1. Knee effusion, left          DISPOSITION/ PLAN     Patient seen and evaluated for left knee pain. Patient had denied injury. Assessment consistent with knee effusion/early septic bursitis. Patient is prescribed clindamycin and a larger dose naproxen for 7 days. She is instructed to follow-up with her PCP in 1 week with continued symptoms. She is also provided information [facility] [place] for [place]. She is agreeable with the above plan denies questions or concerns at this time. PATIENT REFERRED TO:  David Montes MD  [address] [place] [address] / ANNELISE HERNANDES .[facility] [id number]      DISCHARGE MEDICATIONS:  Discharge Medication List as of 7/8/2021 10:04 AM      START taking these medications    Details   !! naproxen (NAPROSYN) 500 MG tablet Take 1 tablet by mouth 2 times daily (with meals) for 7 days, Disp-14 tablet, R-0Hold previous Naproxen during this courseNormal      clindamycin (CLEOCIN) 300 MG capsule Take 2 capsules by mouth 3 times daily for 14 days, Disp-84 capsule, R-0Possible septic bursitisNormal       !! - Potential duplicate medications found. Please discuss with provider.           Discharge Medication List as of 7/8/2021 10:04 AM          Discharge Medication List as of 7/8/2021 10:04 AM          JUAN DAVID Woods CNP    (Please note that portions of this note were completed with a voice recognition program. Efforts were made to edit the dictations but occasionally words are mis-transcribed.)           JUAN DAVID Woods CNP  07/08/21 1057

## 2021-07-09 ENCOUNTER — TELEPHONE (OUTPATIENT)
Dept: RHEUMATOLOGY | Age: 66
End: 2021-07-09

## 2021-07-09 DIAGNOSIS — L40.50 PSA (PSORIATIC ARTHRITIS) (HCC): ICD-10-CM

## 2021-07-09 DIAGNOSIS — L40.9 PSORIASIS: ICD-10-CM

## 2021-07-09 RX ORDER — FOLIC ACID 1 MG/1
1 TABLET ORAL DAILY
Qty: 90 TABLET | Refills: 1 | Status: SHIPPED | OUTPATIENT
Start: 2021-07-09 | End: 2021-10-12

## 2021-07-09 RX ORDER — METHOTREXATE 25 MG/ML
10 INJECTION, SOLUTION INTRA-ARTERIAL; INTRAMUSCULAR; INTRAVENOUS WEEKLY
Qty: 4 VIAL | Refills: 0 | Status: SHIPPED | OUTPATIENT
Start: 2021-07-09 | End: 2021-10-12

## 2021-07-09 NOTE — TELEPHONE ENCOUNTER
----- Message from JUAN DAVID Nuno CNP sent at 7/9/2021  9:06 AM EDT -----  Inflammatory marker elevated, no other significant lab abnormalities. Methotrexate refilled. Repeat labs in 4 weeks x2.

## 2021-07-12 DIAGNOSIS — L40.9 PSORIASIS: ICD-10-CM

## 2021-07-12 DIAGNOSIS — L40.50 PSA (PSORIATIC ARTHRITIS) (HCC): ICD-10-CM

## 2021-07-13 RX ORDER — ADALIMUMAB 40MG/0.4ML
KIT SUBCUTANEOUS
Qty: 2 EACH | Refills: 5 | Status: SHIPPED | OUTPATIENT
Start: 2021-07-13 | End: 2021-10-12

## 2021-07-19 ENCOUNTER — TELEPHONE (OUTPATIENT)
Dept: RHEUMATOLOGY | Age: 66
End: 2021-07-19

## 2021-08-10 ENCOUNTER — OFFICE VISIT (OUTPATIENT)
Dept: FAMILY MEDICINE CLINIC | Age: 66
End: 2021-08-10

## 2021-08-10 VITALS
WEIGHT: 169.25 LBS | BODY MASS INDEX: 26.56 KG/M2 | HEIGHT: 67 IN | SYSTOLIC BLOOD PRESSURE: 130 MMHG | TEMPERATURE: 95.6 F | RESPIRATION RATE: 16 BRPM | DIASTOLIC BLOOD PRESSURE: 64 MMHG | HEART RATE: 82 BPM

## 2021-08-10 DIAGNOSIS — L40.9 PSORIASIS: ICD-10-CM

## 2021-08-10 DIAGNOSIS — K21.00 CHRONIC REFLUX ESOPHAGITIS: Primary | ICD-10-CM

## 2021-08-10 DIAGNOSIS — K52.9 IBD (INFLAMMATORY BOWEL DISEASE): ICD-10-CM

## 2021-08-10 PROCEDURE — 3017F COLORECTAL CA SCREEN DOC REV: CPT | Performed by: EMERGENCY MEDICINE

## 2021-08-10 PROCEDURE — 1036F TOBACCO NON-USER: CPT | Performed by: EMERGENCY MEDICINE

## 2021-08-10 PROCEDURE — 99213 OFFICE O/P EST LOW 20 MIN: CPT | Performed by: EMERGENCY MEDICINE

## 2021-08-10 PROCEDURE — G8427 DOCREV CUR MEDS BY ELIG CLIN: HCPCS | Performed by: EMERGENCY MEDICINE

## 2021-08-10 PROCEDURE — G8419 CALC BMI OUT NRM PARAM NOF/U: HCPCS | Performed by: EMERGENCY MEDICINE

## 2021-08-10 RX ORDER — CEPHALEXIN 500 MG/1
CAPSULE ORAL
COMMUNITY
Start: 2021-07-21 | End: 2022-02-22 | Stop reason: ALTCHOICE

## 2021-08-10 RX ORDER — PANTOPRAZOLE SODIUM 40 MG/1
TABLET, DELAYED RELEASE ORAL
Qty: 60 TABLET | Refills: 5 | Status: SHIPPED | OUTPATIENT
Start: 2021-08-10 | End: 2022-07-05

## 2021-08-10 SDOH — ECONOMIC STABILITY: FOOD INSECURITY: WITHIN THE PAST 12 MONTHS, THE FOOD YOU BOUGHT JUST DIDN'T LAST AND YOU DIDN'T HAVE MONEY TO GET MORE.: NEVER TRUE

## 2021-08-10 SDOH — ECONOMIC STABILITY: FOOD INSECURITY: WITHIN THE PAST 12 MONTHS, YOU WORRIED THAT YOUR FOOD WOULD RUN OUT BEFORE YOU GOT MONEY TO BUY MORE.: NEVER TRUE

## 2021-08-10 ASSESSMENT — ENCOUNTER SYMPTOMS
WHEEZING: 0
RHINORRHEA: 0
VOICE CHANGE: 0
SORE THROAT: 0
CONSTIPATION: 0
SHORTNESS OF BREATH: 0
COUGH: 0
VOMITING: 0
CHEST TIGHTNESS: 0
NAUSEA: 0
BACK PAIN: 0
DIARRHEA: 0
ABDOMINAL PAIN: 0
SINUS PRESSURE: 0
TROUBLE SWALLOWING: 0

## 2021-08-10 ASSESSMENT — SOCIAL DETERMINANTS OF HEALTH (SDOH): HOW HARD IS IT FOR YOU TO PAY FOR THE VERY BASICS LIKE FOOD, HOUSING, MEDICAL CARE, AND HEATING?: NOT HARD AT ALL

## 2021-08-10 NOTE — PROGRESS NOTES
Visit Date: 8/10/2021    Subjective:    Marlys Daniel is a 61 y. o.female who presents today for:  Chief Complaint   Patient presents with    Check-Up         HPI:     HPI     Seeing Dr Neri Steve and Rx her Humira and doing well with  it    Had left knee infection gotten antibiotic Bactrim and now Keflex Seen Dr Natalie Lopez Medications:  Current Outpatient Medications   Medication Sig Dispense Refill    cephALEXin (KEFLEX) 500 MG capsule TAKE 1 CAPSULE BY MOUTH EVERY 6 HOURS      pantoprazole (PROTONIX) 40 MG tablet Take 1 tablet by mouth twice daily 60 tablet 5    HUMIRA PEN 40 MG/0.4ML PNKT INJECT 1 PREFILLED PEN SUBCUTANEOUSLY EVERY 2 WEEKS 2 each 5    Tuberculin-Allergy Syringes 28G X 1/2\" 1 ML MISC 1 each by Does not apply route once a week 12 each 3    naproxen (NAPROSYN) 375 MG tablet Take 1 tablet by mouth 2 times daily as needed for Pain 180 tablet 1    Clobetasol Propionate 0.05 % SHAM Apply topoically daily 118 mL 2    sucralfate (CARAFATE) 1 GM/10ML suspension Take 10 mLs by mouth 4 times daily 1200 mL 3    methotrexate Sodium (RHEUMATREX) 50 MG/2ML chemo injection Inject 0.4 mLs into the skin once a week (Patient not taking: Reported on 8/10/2021) 4 vial 0    folic acid (FOLVITE) 1 MG tablet Take 1 tablet by mouth daily (Patient not taking: Reported on 8/10/2021) 90 tablet 1     No current facility-administered medications for this visit. Subjective:      Review of Systems   Constitutional: Negative for appetite change, chills, diaphoresis, fatigue and fever. HENT: Negative for congestion, ear pain, postnasal drip, rhinorrhea, sinus pressure, sneezing, sore throat, trouble swallowing and voice change. Respiratory: Negative for cough, chest tightness, shortness of breath and wheezing. Cardiovascular: Negative for chest pain, palpitations and leg swelling. Gastrointestinal: Negative for abdominal pain, constipation, diarrhea, nausea and vomiting.    Musculoskeletal: Positive for arthralgias. Negative for back pain, joint swelling, myalgias, neck pain and neck stiffness. Right hip and left knee   Neurological: Negative for dizziness, syncope, weakness, light-headedness, numbness and headaches. Objective:     /64 (Site: Left Upper Arm, Position: Sitting, Cuff Size: Medium Adult)   Pulse 82   Temp 95.6 °F (35.3 °C) (Skin)   Resp 16   Ht 5' 7\" (1.702 m)   Wt 169 lb 4 oz (76.8 kg)   BMI 26.51 kg/m²   BP Readings from Last 3 Encounters:   08/10/21 130/64   07/08/21 (!) 145/67   06/28/21 136/72     Wt Readings from Last 3 Encounters:   08/10/21 169 lb 4 oz (76.8 kg)   07/08/21 170 lb (77.1 kg)   06/28/21 170 lb (77.1 kg)       Physical Exam  Vitals reviewed. Constitutional:       Appearance: She is well-developed. HENT:      Head: Normocephalic and atraumatic. Right Ear: External ear normal.      Left Ear: External ear normal.      Nose: Nose normal.   Eyes:      General: No scleral icterus. Conjunctiva/sclera: Conjunctivae normal.      Pupils: Pupils are equal, round, and reactive to light. Neck:      Thyroid: No thyromegaly. Vascular: No JVD. Cardiovascular:      Rate and Rhythm: Normal rate and regular rhythm. Heart sounds: No murmur heard. No friction rub. Pulmonary:      Effort: Pulmonary effort is normal.      Breath sounds: Normal breath sounds. No wheezing or rales. Chest:      Chest wall: No tenderness. Abdominal:      General: Bowel sounds are normal.      Palpations: Abdomen is soft. There is no mass. Tenderness: There is no abdominal tenderness. Musculoskeletal:      Cervical back: Normal range of motion and neck supple. Lymphadenopathy:      Cervical: No cervical adenopathy. Skin:     Findings: No rash. Neurological:      Mental Status: She is alert and oriented to person, place, and time. Psychiatric:         Behavior: Behavior is cooperative. Assessment:         Diagnosis Orders   1. Chronic reflux esophagitis     2. Psoriasis     3. IBD (inflammatory bowel disease)         Plan:      Medications Prescribed:  Orders Placed This Encounter   Medications    pantoprazole (PROTONIX) 40 MG tablet     Sig: Take 1 tablet by mouth twice daily     Dispense:  60 tablet     Refill:  5     Orders Placed:  No orders of the defined types were placed in this encounter. Encourage her to have her Covid Vaccine,     Discussed Colonoscopy, mammogram, Paps but decline till Medicare    Results of Laboratory tests taken 7/8/21 were reviewed with the patient. Results were w/in  acceptable range except for hyperglycemia     Return in about 6 months (around 2/10/2022). Discussed use, benefit, and side effects of prescribedmedications. All patient questions answered. Pt voiced understanding. Instructedto continue current medications, diet and exercise. Patient agreed with treatmentplan.

## 2021-10-12 ENCOUNTER — OFFICE VISIT (OUTPATIENT)
Dept: RHEUMATOLOGY | Age: 66
End: 2021-10-12
Payer: COMMERCIAL

## 2021-10-12 VITALS
BODY MASS INDEX: 27.03 KG/M2 | OXYGEN SATURATION: 98 % | HEIGHT: 67 IN | DIASTOLIC BLOOD PRESSURE: 80 MMHG | WEIGHT: 172.2 LBS | SYSTOLIC BLOOD PRESSURE: 130 MMHG | TEMPERATURE: 97.8 F | HEART RATE: 91 BPM

## 2021-10-12 DIAGNOSIS — R19.7 DIARRHEA, UNSPECIFIED TYPE: ICD-10-CM

## 2021-10-12 DIAGNOSIS — G89.29 CHRONIC MIDLINE LOW BACK PAIN WITHOUT SCIATICA: ICD-10-CM

## 2021-10-12 DIAGNOSIS — M54.50 CHRONIC MIDLINE LOW BACK PAIN WITHOUT SCIATICA: ICD-10-CM

## 2021-10-12 DIAGNOSIS — L40.50 PSA (PSORIATIC ARTHRITIS) (HCC): Primary | ICD-10-CM

## 2021-10-12 DIAGNOSIS — L40.9 PSORIASIS: ICD-10-CM

## 2021-10-12 DIAGNOSIS — Z51.81 MEDICATION MONITORING ENCOUNTER: ICD-10-CM

## 2021-10-12 PROCEDURE — G8419 CALC BMI OUT NRM PARAM NOF/U: HCPCS | Performed by: NURSE PRACTITIONER

## 2021-10-12 PROCEDURE — 1036F TOBACCO NON-USER: CPT | Performed by: NURSE PRACTITIONER

## 2021-10-12 PROCEDURE — G8484 FLU IMMUNIZE NO ADMIN: HCPCS | Performed by: NURSE PRACTITIONER

## 2021-10-12 PROCEDURE — 99214 OFFICE O/P EST MOD 30 MIN: CPT | Performed by: NURSE PRACTITIONER

## 2021-10-12 PROCEDURE — G8427 DOCREV CUR MEDS BY ELIG CLIN: HCPCS | Performed by: NURSE PRACTITIONER

## 2021-10-12 PROCEDURE — 3017F COLORECTAL CA SCREEN DOC REV: CPT | Performed by: NURSE PRACTITIONER

## 2021-10-12 RX ORDER — PREDNISONE 10 MG/1
TABLET ORAL
Qty: 30 TABLET | Refills: 0 | Status: SHIPPED | OUTPATIENT
Start: 2021-10-12 | End: 2021-10-27

## 2021-10-12 RX ORDER — ETANERCEPT 50 MG/ML
50 SOLUTION SUBCUTANEOUS WEEKLY
Qty: 4 EACH | Refills: 5 | Status: SHIPPED | OUTPATIENT
Start: 2021-10-12 | End: 2022-04-04

## 2021-10-12 ASSESSMENT — ENCOUNTER SYMPTOMS
CONSTIPATION: 0
DIARRHEA: 0
ABDOMINAL PAIN: 0
EYE PAIN: 0
EYE ITCHING: 0
TROUBLE SWALLOWING: 0
BACK PAIN: 1
COUGH: 0
SHORTNESS OF BREATH: 0
NAUSEA: 0

## 2021-10-12 NOTE — PROGRESS NOTES
[facility] RHEUMATOLOGY FOLLOW UP NOTE       Date Of Service: 10/12/2021  Provider: Kathia Marshall APRN - CNP    Name: Margo Hagan   MRN: [id number]    Chief Complaint(s)     Chief Complaint   Patient presents with    Follow-up     2 mo f/u        History of Present Illness (HPI)     Margo Hagan  is a(n)66 y.o. female with a hx of anemia, chronic gastritis, reflux esophagitis, compression fracture, depression, diarrhea, pelvic fracture, GI bleed, hx of colon polyps, psoriasis, wt loss here for the f/u evaluation of psoriasis arthritis, psoriasis     Interval hx:    - hair loss with methotrexate- pt does not wish to take anymore   - increased joint pains- does not feel that humira is working    pain affecting the right fingers, right wrist, right elbow, hips, knees, ankles, toes, neck, back  Pain on a scale 0-10: 5/10  Type of pain: intermittent  Timing: mornings  Aggravating factors: lower ext/back: worse with prolonged standing. Hands/wrists: increased use  Alleviating factors: n/a    Associated symptoms:  + swelling/  Redness/ warmth (right hands), + AM stiffness lasting ~ 30 minutes    + psoriasis scalp, corner of eyes, posterior neck, knees    REVIEW OF SYSTEMS: (ROS)    Review of Systems   Constitutional: Negative for fatigue, fever and unexpected weight change. HENT: Negative for congestion and trouble swallowing. Hair loss   Eyes: Negative for pain and itching. Dry eyes   Respiratory: Negative for cough and shortness of breath. Cardiovascular: Negative for chest pain and leg swelling. Gastrointestinal: Negative for abdominal pain, constipation, diarrhea and nausea. Endocrine: Negative for cold intolerance and heat intolerance. Genitourinary: Negative for difficulty urinating, frequency and urgency. Musculoskeletal: Positive for arthralgias, back pain and neck pain. Negative for joint swelling. Skin: Positive for rash. Neurological: Positive for headaches. Negative for dizziness, weakness and numbness. Psychiatric/Behavioral: The patient is not nervous/anxious.         PAST MEDICAL HISTORY      Past Medical History:   Diagnosis Date    Anemia 4/3/14    Chronic gastritis 4/22/14    Chronic reflux esophagitis 4/22/14    Compression fracture     Depression     Diarrhea     Fracture     compression    Fracture of pelvis ([address] 2008    GI bleed     History of colon polyps     Psoriasis     scalp    Weight loss        PAST SURGICAL HISTORY      Past Surgical History:   Procedure Laterality Date    CARPAL TUNNEL RELEASE      bilateral    COLONOSCOPY  2010    DILATION AND CURETTAGE OF UTERUS      OTHER SURGICAL HISTORY  July 2014    \"nodes\" removed from left armpit - Dr. Vincent Gannon History   Problem Relation Age of Onset    Heart Disease Mother     High Blood Pressure Mother     Psoriasis Father     Colon Cancer Sister     Psoriasis Sister     Crohn's Disease Sister     Crohn's Disease Sister        SOCIAL HISTORY      Social History     Tobacco History     Smoking Status  Former Smoker Quit date  9/26/2001 Smoking Frequency  0 packs/day for 0 years (0 pk yrs) Smoking Tobacco Type  Cigarettes    Smokeless Tobacco Use  Never Used          Alcohol History     Alcohol Use Status  No          Drug Use     Drug Use Status  No          Sexual Activity     Sexually Active  Never                ALLERGIES     Allergies   Allergen Reactions    Codeine Nausea And Vomiting and Other (See Comments)     headache    Penicillins Rash       CURRENT MEDICATIONS      Current Outpatient Medications   Medication Sig Dispense Refill    cephALEXin (KEFLEX) 500 MG capsule TAKE 1 CAPSULE BY MOUTH EVERY 6 HOURS      pantoprazole (PROTONIX) 40 MG tablet Take 1 tablet by mouth twice daily 60 tablet 5    HUMIRA PEN 40 MG/0.4ML PNKT INJECT 1 PREFILLED PEN SUBCUTANEOUSLY EVERY 2 WEEKS 2 each 5    Tuberculin-Allergy Syringes 28G X 1/2\" 1 ML MISC 1 each by Does not apply route once a week 12 each 3    naproxen (NAPROSYN) 375 MG tablet Take 1 tablet by mouth 2 times daily as needed for Pain 180 tablet 1    Clobetasol Propionate 0.05 % SHAM Apply topoically daily 118 mL 2    sucralfate (CARAFATE) 1 GM/10ML suspension Take 10 mLs by mouth 4 times daily 1200 mL 3    methotrexate Sodium (RHEUMATREX) 50 MG/2ML chemo injection Inject 0.4 mLs into the skin once a week (Patient not taking: Reported on 8/10/2021) 4 vial 0    folic acid (FOLVITE) 1 MG tablet Take 1 tablet by mouth daily (Patient not taking: Reported on 8/10/2021) 90 tablet 1     No current facility-administered medications for this visit. PHYSICAL EXAMINATION / OBJECTIVE   Objective:  /80 (Site: Left Upper Arm, Position: Sitting, Cuff Size: Large Adult)   Pulse 91   Temp 97.8 °F (36.6 °C)   Ht 5' 7\" (1.702 m)   Wt 172 lb 3.2 oz (78.1 kg)   SpO2 98% Comment: on r/a  BMI 26.97 kg/m²     Physical Exam  Vitals reviewed. Constitutional:       Appearance: She is well-developed. Cardiovascular:      Rate and Rhythm: Normal rate and regular rhythm. Pulmonary:      Effort: Pulmonary effort is normal.      Breath sounds: Normal breath sounds. Abdominal:      Palpations: Abdomen is soft. Tenderness: There is no abdominal tenderness. Musculoskeletal:      Cervical back: Normal range of motion and neck supple. Skin:     General: Skin is warm and dry. Findings: Rash (plaques scalp, knees) present. Neurological:      Mental Status: She is alert and oriented to person, place, and time. Deep Tendon Reflexes: Reflexes are normal and symmetric. Psychiatric:         Thought Content:  Thought content normal.       Upper extremities:    SHOULDERS nontender, no swelling ,   ELBOWS nontender, no swelling,   WRISTS + tender right  HANDS/FINGERS tender & bogginess bilat PIPs    Lower extremities:  HIPS nontender  KNEES + tender bilat, no swelling  ANKLES + tender bilat   FEET : + MTP compression and bogginess bilat       RAPID3 Composite Score = MDHAQ (0-10) + Patient pain VAS (0-10): + Patient global assessment VAS (0-10):     10/12/2021 --- RAPID 3: 0.7 + 5 + 5 = 10.7     Remission: <3  Low Disease Activity: <6  Moderate Disease Activity: >=6 and <=12  High Disease Activity: >12       LABS    CBC  Lab Results   Component Value Date    WBC 9.0 07/08/2021    RBC 4.45 07/08/2021    HGB 13.4 07/08/2021    HCT 43.9 07/08/2021    MCV 98.7 07/08/2021    MCH 30.1 07/08/2021    MCHC 30.5 07/08/2021     07/08/2021       CMP  Lab Results   Component Value Date    CALCIUM 9.5 07/08/2021    LABALBU 4.2 07/08/2021    PROT 6.9 07/08/2021     07/08/2021    K 4.5 07/08/2021    CO2 26 07/08/2021     07/08/2021    BUN 15 07/08/2021    CREATININE 0.8 07/08/2021    ALKPHOS 101 07/08/2021    ALT 16 07/08/2021    AST 15 07/08/2021       HgBA1c: No components found for: HGBA1C    No results found for: VITD25      Lab Results   Component Value Date    ANASCRN None Detected 01/20/2021     Lab Results   Component Value Date    SSA SEE BELOW 01/20/2021     Lab Results   Component Value Date    SSB 25 01/20/2021     No results found for: ANTI-SMITH  No results found for: DSDNAAB   No results found for: ANTIRNP  No results found for: C3, C4  No results found for: CCPAB  Lab Results   Component Value Date    RF 10 01/20/2021       No components found for: CANCASCRN, APANCASCRN  Lab Results   Component Value Date    SEDRATE 15 07/08/2021     Lab Results   Component Value Date    CRP 9.86 (H) 07/08/2021       RADIOLOGY:         ASSESSMENT/PLAN    Assessment   Plan     Psoriatic arthritis  - arthralgia for years, + prolonged AM stiffness, improvement with NSAIDs. Intermittent diarrhea w/ reported hx of ulcerative colitis. Psoriasis. + fmhx of psoriasis: father, sister, son. Crohns in sister.  Exam with synovitis, DIP nodules, posterior heel spurring  - prior tx: methotrexate 10 mg PO (GI upset), methotrexate 10 mg subcu (hair loss)   - stop humira 40 mg subcu q 2 weeks due to continued disease activity   - start enbrel 50 mg subcu weekly. Patient aware of side effects   - naproxen 375 mg BID    Psoriasis- scalp, knees    Chronic midline low back pain without sciatica  - xray lumbar spine w/ bone spurring, SI joint sclerosis. Osteoarthritis bilat hands and feet   - continue naproxen 220 mg BID PRN    Medication monitoring   - CBC, CMP, sed rate, CRP q 4-6 months      No follow-ups on file. Electronically signed by JUAN DAVID Rizzo CNP on 10/12/2021 at 11:07 AM    New Prescriptions    No medications on file       Thank you for allowing me to participate in the care of this patient. Please call if there are any questions.

## 2021-10-20 ENCOUNTER — TELEPHONE (OUTPATIENT)
Dept: RHEUMATOLOGY | Age: 66
End: 2021-10-20

## 2021-10-21 NOTE — TELEPHONE ENCOUNTER
[id number] Enbrel SureClick 93AA/SJ SC SOAJ Quantity:4;     PA APPROVAL Attempted to call no answer LM

## 2022-02-17 ENCOUNTER — OFFICE VISIT (OUTPATIENT)
Dept: RHEUMATOLOGY | Age: 67
End: 2022-02-17
Payer: MEDICARE

## 2022-02-17 VITALS
HEIGHT: 67 IN | WEIGHT: 181.6 LBS | DIASTOLIC BLOOD PRESSURE: 82 MMHG | OXYGEN SATURATION: 94 % | SYSTOLIC BLOOD PRESSURE: 146 MMHG | HEART RATE: 76 BPM | BODY MASS INDEX: 28.5 KG/M2

## 2022-02-17 DIAGNOSIS — Z51.81 MEDICATION MONITORING ENCOUNTER: ICD-10-CM

## 2022-02-17 DIAGNOSIS — L40.9 PSORIASIS: ICD-10-CM

## 2022-02-17 DIAGNOSIS — M54.50 CHRONIC MIDLINE LOW BACK PAIN WITHOUT SCIATICA: ICD-10-CM

## 2022-02-17 DIAGNOSIS — G89.29 CHRONIC MIDLINE LOW BACK PAIN WITHOUT SCIATICA: ICD-10-CM

## 2022-02-17 DIAGNOSIS — L40.50 PSA (PSORIATIC ARTHRITIS) (HCC): Primary | ICD-10-CM

## 2022-02-17 PROCEDURE — 99214 OFFICE O/P EST MOD 30 MIN: CPT | Performed by: INTERNAL MEDICINE

## 2022-02-17 RX ORDER — LEFLUNOMIDE 10 MG/1
10 TABLET ORAL DAILY
Qty: 30 TABLET | Refills: 0 | Status: SHIPPED | OUTPATIENT
Start: 2022-02-17 | End: 2022-09-19

## 2022-02-17 ASSESSMENT — ENCOUNTER SYMPTOMS
BACK PAIN: 1
EYE PAIN: 0
SHORTNESS OF BREATH: 0
NAUSEA: 0
CONSTIPATION: 0
COUGH: 0
ABDOMINAL PAIN: 0
TROUBLE SWALLOWING: 0
DIARRHEA: 0
EYE ITCHING: 0

## 2022-02-17 NOTE — PROGRESS NOTES
[facility] RHEUMATOLOGY FOLLOW UP NOTE       Date Of Service: 2/17/2022  Provider: Jack Ocasio DO    Name: Mason Womack   MRN: [id number]    Chief Complaint(s)     Chief Complaint   Patient presents with    Follow-up     4 month         History of Present Illness (HPI)     Mason Womack  is a(n)67 y.o. female with a hx of anemia, chronic gastritis, reflux esophagitis, compression fracture, depression, diarrhea, pelvic fracture, GI bleed, hx of colon polyps, psoriasis, wt loss here for the f/u evaluation of psoriasis arthritis, psoriasis     Psoriasis  - scalp, + itching. + nail changes bilat hand and feet. Pain 4/10 over the past week with constant pain in the neck, lower back. . intermittentp ain in the right finger, right wrist, bilat hips, right ankel and lowr back. Timing: mornings Aggravating factors: lower ext/back: worse with prolonged standing. Hands/wrists: increased use. Alleviating factors: n/a     -- denies joint swelling   --  Stiffness ~ 30 min. --  hair loss worsened over the past 2-3 months. Recently started biotene. REVIEW OF SYSTEMS: (ROS)    Review of Systems   Constitutional: Negative for fatigue, fever and unexpected weight change. HENT: Negative for congestion and trouble swallowing. Eyes: Negative for pain and itching. Dry eyes   Respiratory: Negative for cough and shortness of breath. Cardiovascular: Negative for chest pain and leg swelling. Gastrointestinal: Negative for abdominal pain, constipation, diarrhea and nausea. Endocrine: Negative for cold intolerance and heat intolerance. Genitourinary: Negative for difficulty urinating, frequency and urgency. Musculoskeletal: Positive for arthralgias, back pain and neck pain. Negative for joint swelling. Skin: Positive for rash. Neurological: Positive for headaches. Negative for dizziness, weakness and numbness. Psychiatric/Behavioral: The patient is not nervous/anxious.         PAST MEDICAL HISTORY      Past Medical History:   Diagnosis Date    Anemia 4/3/14    Chronic gastritis 4/22/14    Chronic reflux esophagitis 4/22/14    Compression fracture     Depression     Diarrhea     Fracture     compression    Fracture of pelvis ([address] 2008    GI bleed     History of colon polyps     Psoriasis     scalp    Weight loss        PAST SURGICAL HISTORY      Past Surgical History:   Procedure Laterality Date    CARPAL TUNNEL RELEASE      bilateral    COLONOSCOPY  2010    DILATION AND CURETTAGE OF UTERUS      OTHER SURGICAL HISTORY  July 2014    \"nodes\" removed from left armpit - Dr. Nicolas Cordero History   Problem Relation Age of Onset    Heart Disease Mother     High Blood Pressure Mother     Psoriasis Father     Colon Cancer Sister     Psoriasis Sister     Crohn's Disease Sister     Crohn's Disease Sister        SOCIAL HISTORY      Social History     Tobacco History     Smoking Status  Former Smoker Quit date  9/26/2001 Smoking Frequency  0 packs/day for 0 years (0 pk yrs) Smoking Tobacco Type  Cigarettes    Smokeless Tobacco Use  Never Used          Alcohol History     Alcohol Use Status  No          Drug Use     Drug Use Status  No          Sexual Activity     Sexually Active  Never                ALLERGIES     Allergies   Allergen Reactions    Codeine Nausea And Vomiting and Other (See Comments)     headache    Penicillins Rash       CURRENT MEDICATIONS      Current Outpatient Medications   Medication Sig Dispense Refill    Etanercept (ENBREL SURECLICK) 50 MG/ML SOAJ Inject 50 mg into the skin once a week 4 each 5    cephALEXin (KEFLEX) 500 MG capsule TAKE 1 CAPSULE BY MOUTH EVERY 6 HOURS      pantoprazole (PROTONIX) 40 MG tablet Take 1 tablet by mouth twice daily 60 tablet 5    Tuberculin-Allergy Syringes 28G X 1/2\" 1 ML MISC 1 each by Does not apply route once a week 12 each 3    naproxen (NAPROSYN) 375 MG tablet Take 1 tablet by mouth 2 times daily as needed for Pain 180 tablet 1    Clobetasol Propionate 0.05 % SHAM Apply topoically daily 118 mL 2    sucralfate (CARAFATE) 1 GM/10ML suspension Take 10 mLs by mouth 4 times daily 1200 mL 3     No current facility-administered medications for this visit. PHYSICAL EXAMINATION / OBJECTIVE   Objective:  BP (!) 146/82 (Site: Left Upper Arm, Position: Sitting, Cuff Size: Medium Adult)   Pulse 76   Ht 5' 7.01\" (1.702 m)   Wt 181 lb 9.6 oz (82.4 kg)   SpO2 94%   BMI 28.44 kg/m²     Physical Exam  Vitals reviewed. Constitutional:       General: She is not in acute distress. Appearance: She is well-developed. She is not diaphoretic. HENT:      Head: Normocephalic and atraumatic. Nose: Nose normal.      Mouth/Throat:      Mouth: Mucous membranes are moist.   Eyes:      General: No scleral icterus. Conjunctiva/sclera: Conjunctivae normal.   Cardiovascular:      Rate and Rhythm: Normal rate and regular rhythm. Heart sounds: Normal heart sounds. Pulmonary:      Effort: Pulmonary effort is normal. No respiratory distress. Breath sounds: Normal breath sounds. No wheezing or rales. Abdominal:      Palpations: Abdomen is soft. Tenderness: There is no abdominal tenderness. Musculoskeletal:      Cervical back: Normal range of motion and neck supple. Lymphadenopathy:      Cervical: No cervical adenopathy. Skin:     General: Skin is warm and dry. Findings: Rash (plaques posterior scalp. w/ silver scale ) present. Neurological:      Mental Status: She is alert and oriented to person, place, and time. Deep Tendon Reflexes: Reflexes are normal and symmetric. Psychiatric:         Behavior: Behavior normal.         Thought Content: Thought content normal.       Upper extremities:    SHOULDERS nontender, no swelling ,   ELBOWS nontender, no swelling,   WRISTS + tender right  HANDS/FINGERS PIPs: tender Right 3rd, left 2-4.  MCPs tender Right #3    Lower extremities:  HIPS nontender  KNEES Non-tender   ANKLES Non-tender   FEET : + MTP compression , mid foot pain, +warmth left mid foot. Tender left mid sole. RAPID3 Composite Score = MDHAQ (0-10) + Patient pain VAS (0-10): + Patient global assessment VAS (0-10):     2/17/2022 --- RAPID 3: 0 + 4 + 1.5 = 5.5    Remission: <3  Low Disease Activity: <6  Moderate Disease Activity: >=6 and <=12  High Disease Activity: >12       LABS    CBC  Lab Results   Component Value Date    WBC 9.0 07/08/2021    RBC 4.45 07/08/2021    HGB 13.4 07/08/2021    HCT 43.9 07/08/2021    MCV 98.7 07/08/2021    MCH 30.1 07/08/2021    MCHC 30.5 07/08/2021     07/08/2021       CMP  Lab Results   Component Value Date    CALCIUM 9.5 07/08/2021    LABALBU 4.2 07/08/2021    PROT 6.9 07/08/2021     07/08/2021    K 4.5 07/08/2021    CO2 26 07/08/2021     07/08/2021    BUN 15 07/08/2021    CREATININE 0.8 07/08/2021    ALKPHOS 101 07/08/2021    ALT 16 07/08/2021    AST 15 07/08/2021       HgBA1c: No components found for: HGBA1C    No results found for: VITD25      Lab Results   Component Value Date    ANASCRN None Detected 01/20/2021     Lab Results   Component Value Date    SSA SEE BELOW 01/20/2021     Lab Results   Component Value Date    SSB 25 01/20/2021     No results found for: ANTI-SMITH  No results found for: DSDNAAB   No results found for: ANTIRNP  No results found for: C3, C4  No results found for: CCPAB  Lab Results   Component Value Date    RF 10 01/20/2021       No components found for: CANCASCRN, APANCASCRN  Lab Results   Component Value Date    SEDRATE 15 07/08/2021     Lab Results   Component Value Date    CRP 9.86 (H) 07/08/2021       RADIOLOGY:         ASSESSMENT/PLAN    Assessment   Plan     Psoriatic arthritis -  Active. - arthralgia for years, + prolonged AM stiffness, improvement with NSAIDs. Intermittent diarrhea w/ reported hx of ulcerative colitis. Psoriasis. + fmhx of psoriasis: father, sister, son. Crohns in sister.  Exam with synovitis, DIP nodules, posterior heel spurring  - prior tx: methotrexate 10 mg PO (GI upset), methotrexate 10 mg subcu (hair loss) , humira 40mg  January  to oct 2021 - stopped bc/ con. Psoriasis  And synovitis. -- avoiding IL-17 inhib b/c h/o ulcerative colitis  --  enbrel 50 mg subcu weekly. (aug 2021)   -- naproxen 375 mg TWICE DAILY  -- star arava 10mg daily 2/17/22 . - Side effects: GI intolerance, diarrhea, alopecia, infection; weight loss, low blood counts, liver injury, ILD, oral sores, rash; RARE: neuropathy, interstitial lung disease. Known teratogen (causes birth defects)- hold medication with infections, avoid Alcohol consumption while on the medication.  - blood tests will be needed every 2-4 weeks with the start of arava   -- alt tx option addition of arava, Sulfasalazine  OR change of Enbrel to tremfya     Psoriasis- scalp - active. Chronic midline low back pain without sciatica - active. - xray lumbar spine w/ bone spurring, SI joint sclerosis. Osteoarthritis bilat hands and feet   - continue naproxen 220 mg BID PRN    Medication monitoring   - CBC, CMP, sed rate, CRP q 4 weeks. Return in about 2 months (around 4/17/2022). Electronically signed by Benoit Merritt DO on 2/17/2022 at 2:09 PM    New Prescriptions    No medications on file       Thank you for allowing me to participate in the care of this patient. Please call if there are any questions.

## 2022-02-18 LAB
ABSOLUTE BASO #: 0.1 X10E9/L (ref 0–0.2)
ABSOLUTE EOS #: 0.1 X10E9/L (ref 0–0.4)
ABSOLUTE LYMPH #: 2.3 X10E9/L (ref 1–3.5)
ABSOLUTE MONO #: 0.4 X10E9/L (ref 0–0.9)
ABSOLUTE NEUT #: 3 X10E9/L (ref 1.5–6.6)
ALBUMIN SERPL-MCNC: 4.7 G/DL (ref 3.2–5.3)
ALK PHOSPHATASE: 85 U/L (ref 39–130)
ALT SERPL-CCNC: 17 U/L (ref 0–31)
ANION GAP SERPL CALCULATED.3IONS-SCNC: 8 MMOL/L (ref 5–15)
AST SERPL-CCNC: 22 U/L (ref 0–41)
BASOPHILS RELATIVE PERCENT: 0.9 %
BILIRUB SERPL-MCNC: 0.6 MG/DL (ref 0.3–1.2)
BUN BLDV-MCNC: 17 MG/DL (ref 5–27)
C-REACTIVE PROTEIN: 0.9 MG/DL (ref 0–0.74)
CALCIUM SERPL-MCNC: 9.7 MG/DL (ref 8.5–10.5)
CHLORIDE BLD-SCNC: 103 MMOL/L (ref 98–109)
CO2: 30 MMOL/L (ref 22–32)
CREAT SERPL-MCNC: 0.86 MG/DL (ref 0.4–1)
EGFR AFRICAN AMERICAN: >60 ML/MIN/1.73SQ.M
EGFR IF NONAFRICAN AMERICAN: >60 ML/MIN/1.73SQ.M
EOSINOPHILS RELATIVE PERCENT: 1.8 %
GLUCOSE: 76 MG/DL (ref 65–99)
HCT VFR BLD CALC: 39.1 % (ref 35–47)
HEMOGLOBIN: 13 G/DL (ref 11.7–15.5)
LYMPHOCYTE %: 39 %
MCH RBC QN AUTO: 30.7 PG (ref 27–34)
MCHC RBC AUTO-ENTMCNC: 33.1 G/DL (ref 32–36)
MCV RBC AUTO: 93 FL (ref 80–100)
MONOCYTES # BLD: 6.8 %
NEUTROPHILS RELATIVE PERCENT: 51.5 %
PDW BLD-RTO: 13.8 % (ref 11.5–15)
PLATELETS: 239 X10E9/L (ref 150–450)
PMV BLD AUTO: 9.7 FL (ref 7–12)
POTASSIUM SERPL-SCNC: 4 MMOL/L (ref 3.5–5)
RBC: 4.23 X10E12/L (ref 3.8–5.2)
SEDIMENTATION RATE, ERYTHROCYTE: 6 MM/H (ref 0–30)
SODIUM BLD-SCNC: 141 MMOL/L (ref 134–146)
TOTAL PROTEIN: 7.2 G/DL (ref 6–8)
WBC: 5.9 X10E9/L (ref 4–11)

## 2022-02-21 ENCOUNTER — TELEPHONE (OUTPATIENT)
Dept: RHEUMATOLOGY | Age: 67
End: 2022-02-21

## 2022-02-21 NOTE — TELEPHONE ENCOUNTER
----- Message from Lucina Morales DO sent at 2/18/2022  8:47 AM EST -----  Testing revealing a significant improvement of the inflammatory markers.   Please continue with current plan and have your labs repeated in 4 weeks

## 2022-02-22 ENCOUNTER — OFFICE VISIT (OUTPATIENT)
Dept: FAMILY MEDICINE CLINIC | Age: 67
End: 2022-02-22

## 2022-02-22 VITALS
HEART RATE: 68 BPM | RESPIRATION RATE: 14 BRPM | SYSTOLIC BLOOD PRESSURE: 138 MMHG | WEIGHT: 179.38 LBS | HEIGHT: 67 IN | BODY MASS INDEX: 28.16 KG/M2 | DIASTOLIC BLOOD PRESSURE: 72 MMHG | TEMPERATURE: 97 F

## 2022-02-22 DIAGNOSIS — Z78.0 POSTMENOPAUSAL: ICD-10-CM

## 2022-02-22 DIAGNOSIS — K21.00 CHRONIC REFLUX ESOPHAGITIS: Primary | ICD-10-CM

## 2022-02-22 DIAGNOSIS — Z12.31 ENCOUNTER FOR SCREENING MAMMOGRAM FOR MALIGNANT NEOPLASM OF BREAST: ICD-10-CM

## 2022-02-22 PROCEDURE — 99213 OFFICE O/P EST LOW 20 MIN: CPT | Performed by: EMERGENCY MEDICINE

## 2022-02-22 PROCEDURE — G8427 DOCREV CUR MEDS BY ELIG CLIN: HCPCS | Performed by: EMERGENCY MEDICINE

## 2022-02-22 PROCEDURE — 3017F COLORECTAL CA SCREEN DOC REV: CPT | Performed by: EMERGENCY MEDICINE

## 2022-02-22 PROCEDURE — 1036F TOBACCO NON-USER: CPT | Performed by: EMERGENCY MEDICINE

## 2022-02-22 PROCEDURE — 1090F PRES/ABSN URINE INCON ASSESS: CPT | Performed by: EMERGENCY MEDICINE

## 2022-02-22 PROCEDURE — 1123F ACP DISCUSS/DSCN MKR DOCD: CPT | Performed by: EMERGENCY MEDICINE

## 2022-02-22 PROCEDURE — G8400 PT W/DXA NO RESULTS DOC: HCPCS | Performed by: EMERGENCY MEDICINE

## 2022-02-22 PROCEDURE — G8417 CALC BMI ABV UP PARAM F/U: HCPCS | Performed by: EMERGENCY MEDICINE

## 2022-02-22 ASSESSMENT — PATIENT HEALTH QUESTIONNAIRE - PHQ9
SUM OF ALL RESPONSES TO PHQ QUESTIONS 1-9: 3
7. TROUBLE CONCENTRATING ON THINGS, SUCH AS READING THE NEWSPAPER OR WATCHING TELEVISION: 0
SUM OF ALL RESPONSES TO PHQ QUESTIONS 1-9: 3
SUM OF ALL RESPONSES TO PHQ QUESTIONS 1-9: 3
9. THOUGHTS THAT YOU WOULD BE BETTER OFF DEAD, OR OF HURTING YOURSELF: 0
8. MOVING OR SPEAKING SO SLOWLY THAT OTHER PEOPLE COULD HAVE NOTICED. OR THE OPPOSITE, BEING SO FIGETY OR RESTLESS THAT YOU HAVE BEEN MOVING AROUND A LOT MORE THAN USUAL: 0
SUM OF ALL RESPONSES TO PHQ9 QUESTIONS 1 & 2: 0
4. FEELING TIRED OR HAVING LITTLE ENERGY: 0
6. FEELING BAD ABOUT YOURSELF - OR THAT YOU ARE A FAILURE OR HAVE LET YOURSELF OR YOUR FAMILY DOWN: 0
3. TROUBLE FALLING OR STAYING ASLEEP: 3
SUM OF ALL RESPONSES TO PHQ QUESTIONS 1-9: 3
1. LITTLE INTEREST OR PLEASURE IN DOING THINGS: 0
5. POOR APPETITE OR OVEREATING: 0
10. IF YOU CHECKED OFF ANY PROBLEMS, HOW DIFFICULT HAVE THESE PROBLEMS MADE IT FOR YOU TO DO YOUR WORK, TAKE CARE OF THINGS AT HOME, OR GET ALONG WITH OTHER PEOPLE: 0
2. FEELING DOWN, DEPRESSED OR HOPELESS: 0

## 2022-02-22 ASSESSMENT — ENCOUNTER SYMPTOMS
RHINORRHEA: 0
DIARRHEA: 0
CONSTIPATION: 0
ABDOMINAL DISTENTION: 0
BLOOD IN STOOL: 0
TROUBLE SWALLOWING: 0
ABDOMINAL PAIN: 0
WHEEZING: 0
SINUS PRESSURE: 0
CHEST TIGHTNESS: 0
SORE THROAT: 0
VOICE CHANGE: 0
ROS SKIN COMMENTS: PSORIASIS
COUGH: 0
EYE PAIN: 0
VOMITING: 0
NAUSEA: 0
BACK PAIN: 0
SHORTNESS OF BREATH: 0
PHOTOPHOBIA: 0

## 2022-02-22 NOTE — PROGRESS NOTES
Visit Date: 2/22/2022    Subjective:    Alberto Martinez is a 74 y. o.female who presents today for:  Chief Complaint   Patient presents with    Depression    Anxiety         HPI:     HPI     Doing internal decorations and wedding venues. She otherwise doing well, No SOB, No chest pain , No fatigue. No nausea nor abdominal pain      CurrentHome Medications:  Current Outpatient Medications   Medication Sig Dispense Refill    leflunomide (ARAVA) 10 MG tablet Take 1 tablet by mouth daily 30 tablet 0    Etanercept (ENBREL SURECLICK) 50 MG/ML SOAJ Inject 50 mg into the skin once a week 4 each 5    pantoprazole (PROTONIX) 40 MG tablet Take 1 tablet by mouth twice daily 60 tablet 5    Tuberculin-Allergy Syringes 28G X 1/2\" 1 ML MISC 1 each by Does not apply route once a week 12 each 3    naproxen (NAPROSYN) 375 MG tablet Take 1 tablet by mouth 2 times daily as needed for Pain 180 tablet 1    Clobetasol Propionate 0.05 % SHAM Apply topoically daily 118 mL 2    sucralfate (CARAFATE) 1 GM/10ML suspension Take 10 mLs by mouth 4 times daily 1200 mL 3     No current facility-administered medications for this visit. Subjective:      Review of Systems   Constitutional: Negative for appetite change, chills, diaphoresis, fatigue and fever. HENT: Negative for congestion, ear discharge, ear pain, postnasal drip, rhinorrhea, sinus pressure, sneezing, sore throat, trouble swallowing and voice change. Eyes: Negative for photophobia, pain and visual disturbance. Respiratory: Negative for cough, chest tightness, shortness of breath and wheezing. Cardiovascular: Negative for chest pain, palpitations and leg swelling. Gastrointestinal: Negative for abdominal distention, abdominal pain, blood in stool, constipation, diarrhea, nausea and vomiting. Genitourinary: Negative for dysuria, flank pain, frequency, genital sores, hematuria, urgency, vaginal bleeding and vaginal discharge.    Musculoskeletal: Negative for arthralgias, back pain, gait problem, joint swelling, myalgias, neck pain and neck stiffness. Skin: Positive for rash. Negative for pallor. psoriasis   Neurological: Negative for dizziness, tremors, syncope, speech difficulty, weakness, light-headedness, numbness and headaches. Hematological: Does not bruise/bleed easily. Psychiatric/Behavioral: Negative for agitation, confusion, dysphoric mood, hallucinations, sleep disturbance and suicidal ideas. The patient is not nervous/anxious. All other systems reviewed and are negative. Objective:     /72 (Site: Right Upper Arm, Position: Sitting, Cuff Size: Medium Adult)   Pulse 68   Temp 97 °F (36.1 °C) (Skin)   Resp 14   Ht 5' 7\" (1.702 m)   Wt 179 lb 6 oz (81.4 kg)   BMI 28.09 kg/m²   BP Readings from Last 3 Encounters:   02/22/22 138/72   02/17/22 (!) 146/82   10/12/21 130/80     Wt Readings from Last 3 Encounters:   02/22/22 179 lb 6 oz (81.4 kg)   02/17/22 181 lb 9.6 oz (82.4 kg)   10/12/21 172 lb 3.2 oz (78.1 kg)       Physical Exam  Vitals reviewed. Constitutional:       Appearance: She is well-developed. HENT:      Head: Normocephalic and atraumatic. Right Ear: External ear normal.      Left Ear: External ear normal.      Nose: Nose normal.   Eyes:      General: No scleral icterus. Conjunctiva/sclera: Conjunctivae normal.      Pupils: Pupils are equal, round, and reactive to light. Neck:      Thyroid: No thyromegaly. Vascular: No JVD. Cardiovascular:      Rate and Rhythm: Normal rate and regular rhythm. Heart sounds: No murmur heard. No friction rub. Pulmonary:      Effort: Pulmonary effort is normal.      Breath sounds: Normal breath sounds. No wheezing or rales. Chest:      Chest wall: No tenderness. Abdominal:      General: Bowel sounds are normal.      Palpations: Abdomen is soft. There is no mass. Tenderness: There is no abdominal tenderness.    Musculoskeletal:      Cervical back: Normal range of motion and neck supple. Lymphadenopathy:      Cervical: No cervical adenopathy. Skin:     Findings: No rash. Neurological:      Mental Status: She is alert and oriented to person, place, and time. Psychiatric:         Behavior: Behavior is cooperative. Assessment:         Diagnosis Orders   1. Chronic reflux esophagitis     2. Postmenopausal  DEXA BONE DENSITY 2 SITES   3. Encounter for screening mammogram for malignant neoplasm of breast  XIAO DIGITAL SCREEN W OR WO CAD BILATERAL       Plan:      Medications Prescribed:  No orders of the defined types were placed in this encounter. Orders Placed:  Orders Placed This Encounter   Procedures    DEXA BONE DENSITY 2 SITES     Standing Status:   Future     Standing Expiration Date:   2/22/2023    XIAO DIGITAL SCREEN W OR WO CAD BILATERAL     Standing Status:   Future     Standing Expiration Date:   4/25/2023     Advised to get Covid, flu and pneumonia vaccine. Results of Laboratory tests taken 2/17/22 were reviewed with the patient. Results were w/in  acceptable range     Return in about 6 months (around 8/22/2022) for GERD. Discussed use, benefit, and side effects of prescribedmedications. All patient questions answered. Pt voiced understanding. Instructedto continue current medications, diet and exercise. Patient agreed with treatmentplan.

## 2022-03-07 ENCOUNTER — TELEPHONE (OUTPATIENT)
Dept: RHEUMATOLOGY | Age: 67
End: 2022-03-07

## 2022-03-07 NOTE — TELEPHONE ENCOUNTER
Please hold the [address] until the symptoms resolved. Would like the patient to restart the medication once her current symptoms have resolved. If the  cold like symptoms return after reinitiation/restart of medication we can discuss potential other treatment options.

## 2022-03-07 NOTE — TELEPHONE ENCOUNTER
Lamont Marrero called in saying that she started arava after last office visit 2/17/22 and has already gotten a cold. Blames the arava and has stopped taking it.

## 2022-04-04 DIAGNOSIS — L40.9 PSORIASIS: ICD-10-CM

## 2022-04-04 DIAGNOSIS — L40.50 PSA (PSORIATIC ARTHRITIS) (HCC): ICD-10-CM

## 2022-04-04 RX ORDER — ETANERCEPT 50 MG/ML
SOLUTION SUBCUTANEOUS
Qty: 4 EACH | Refills: 5 | Status: SHIPPED
Start: 2022-04-04 | End: 2022-10-27 | Stop reason: ALTCHOICE

## 2022-04-14 ENCOUNTER — TELEPHONE (OUTPATIENT)
Dept: RHEUMATOLOGY | Age: 67
End: 2022-04-14

## 2022-07-05 RX ORDER — PANTOPRAZOLE SODIUM 40 MG/1
TABLET, DELAYED RELEASE ORAL
Qty: 60 TABLET | Refills: 1 | Status: SHIPPED | OUTPATIENT
Start: 2022-07-05 | End: 2022-10-27 | Stop reason: SDUPTHER

## 2022-07-05 NOTE — TELEPHONE ENCOUNTER
The pharmacy is  requesting a refill of the below medication which has been pended for you:     Requested Prescriptions     Pending Prescriptions Disp Refills    pantoprazole (PROTONIX) 40 MG tablet [Pharmacy Med Name: Pantoprazole Sodium 40 MG Oral Tablet Delayed Release] 60 tablet 1     Sig: Take 1 tablet by mouth twice daily       Last Appointment Date: 2/22/2022  Next Appointment Date: 8/23/2022    Allergies   Allergen Reactions    Codeine Nausea And Vomiting and Other (See Comments)     headache    Penicillins Rash

## 2022-09-19 ENCOUNTER — OFFICE VISIT (OUTPATIENT)
Dept: RHEUMATOLOGY | Age: 67
End: 2022-09-19
Payer: MEDICARE

## 2022-09-19 VITALS
BODY MASS INDEX: 26.09 KG/M2 | HEIGHT: 67 IN | DIASTOLIC BLOOD PRESSURE: 80 MMHG | SYSTOLIC BLOOD PRESSURE: 128 MMHG | WEIGHT: 166.2 LBS | OXYGEN SATURATION: 96 % | HEART RATE: 76 BPM

## 2022-09-19 DIAGNOSIS — G89.29 CHRONIC MIDLINE LOW BACK PAIN WITHOUT SCIATICA: ICD-10-CM

## 2022-09-19 DIAGNOSIS — M54.50 CHRONIC MIDLINE LOW BACK PAIN WITHOUT SCIATICA: ICD-10-CM

## 2022-09-19 DIAGNOSIS — Z51.81 MEDICATION MONITORING ENCOUNTER: ICD-10-CM

## 2022-09-19 DIAGNOSIS — M15.9 OSTEOARTHRITIS OF MULTIPLE JOINTS, UNSPECIFIED OSTEOARTHRITIS TYPE: ICD-10-CM

## 2022-09-19 DIAGNOSIS — L40.9 PSORIASIS: ICD-10-CM

## 2022-09-19 DIAGNOSIS — L40.50 PSA (PSORIATIC ARTHRITIS) (HCC): Primary | ICD-10-CM

## 2022-09-19 PROCEDURE — 99214 OFFICE O/P EST MOD 30 MIN: CPT | Performed by: NURSE PRACTITIONER

## 2022-09-19 PROCEDURE — 1123F ACP DISCUSS/DSCN MKR DOCD: CPT | Performed by: NURSE PRACTITIONER

## 2022-09-19 RX ORDER — LEFLUNOMIDE 10 MG/1
10 TABLET ORAL DAILY
Qty: 30 TABLET | Refills: 0 | Status: SHIPPED | OUTPATIENT
Start: 2022-09-19 | End: 2022-10-18 | Stop reason: SDUPTHER

## 2022-09-19 ASSESSMENT — ENCOUNTER SYMPTOMS
TROUBLE SWALLOWING: 0
SHORTNESS OF BREATH: 0
BACK PAIN: 1
EYE PAIN: 0
NAUSEA: 0
DIARRHEA: 1
ABDOMINAL PAIN: 1
EYE ITCHING: 0
CONSTIPATION: 0
COUGH: 0

## 2022-09-19 ASSESSMENT — JOINT PAIN
TOTAL NUMBER OF TENDER JOINTS: 15
TOTAL NUMBER OF SWOLLEN JOINTS: 7

## 2022-09-19 NOTE — PROGRESS NOTES
[facility] RHEUMATOLOGY FOLLOW UP NOTE       Date Of Service: 9/19/2022  Provider: JUAN DAVID Ashraf - CNP    Name: Evangelina Guillermo   MRN: [id number]    Chief Complaint(s)     Chief Complaint   Patient presents with    Follow-up     2  month neck hart middle finger rt hand   ankles toes        History of Present Illness (HPI)     Evangelina Guillermo  is a(n)67 y.o. female with a hx of anemia, chronic gastritis, reflux esophagitis, compression fracture, depression, diarrhea, pelvic fracture, GI bleed, hx of colon polyps, psoriasis, wt loss here for the f/u evaluation of psoriasis arthritis, psoriasis     Interval hx:    - stopped arava 3/2022 after getting a cold- thought it was due to medication   - feels the enbrel is not helping    pain affecting the fingers, ankle, feet, neck, back, left hip  Pain on a scale 0-10: 6/10  Type of pain: intermittent  Timing: mornings  Aggravating factors: lower ext/back: worse with prolonged standing. Hands/wrists: increased use  Alleviating factors: n/a    Associated symptoms:  + swelling/  Redness/ warmth (fingers), + AM stiffness lasting ~ 30 minutes    + psoriasis scalp, left foot    REVIEW OF SYSTEMS: (ROS)    Review of Systems   Constitutional:  Negative for fatigue, fever and unexpected weight change. HENT:  Negative for congestion and trouble swallowing. Hair loss   Eyes:  Negative for pain and itching. Dry eyes   Respiratory:  Negative for cough and shortness of breath. Cardiovascular:  Negative for chest pain and leg swelling. Gastrointestinal:  Positive for abdominal pain and diarrhea. Negative for constipation and nausea. Endocrine: Negative for cold intolerance and heat intolerance. Genitourinary:  Negative for difficulty urinating, frequency and urgency. Musculoskeletal:  Positive for arthralgias, back pain and neck pain. Negative for joint swelling. Skin:  Positive for rash. Neurological:  Positive for headaches.  Negative for dizziness, weakness and numbness. Psychiatric/Behavioral:  The patient is not nervous/anxious.       PAST MEDICAL HISTORY      Past Medical History:   Diagnosis Date    Anemia 4/3/14    Chronic gastritis 4/22/14    Chronic reflux esophagitis 4/22/14    Compression fracture     Depression     Diarrhea     Fracture     compression    Fracture of pelvis ([address] 2008    GI bleed     History of colon polyps     Psoriasis     scalp    Weight loss        PAST SURGICAL HISTORY      Past Surgical History:   Procedure Laterality Date    CARPAL TUNNEL RELEASE      bilateral    COLONOSCOPY  2010    DILATION AND CURETTAGE OF UTERUS      OTHER SURGICAL HISTORY  July 2014    \"nodes\" removed from left armpit - Dr. Barbara Robison History   Problem Relation Age of Onset    Heart Disease Mother     High Blood Pressure Mother     Psoriasis Father     Colon Cancer Sister     Psoriasis Sister     Crohn's Disease Sister     Crohn's Disease Sister        SOCIAL HISTORY      Social History       Tobacco History       Smoking Status  Former Smoker Quit date  9/26/2001 Smoking Frequency  0 packs/day for 0 years (0 pk yrs) Smoking Tobacco Type  Cigarettes      Smokeless Tobacco Use  Never Used              Alcohol History       Alcohol Use Status  No              Drug Use       Drug Use Status  No              Sexual Activity       Sexually Active  Never                    ALLERGIES     Allergies   Allergen Reactions    Codeine Nausea And Vomiting and Other (See Comments)     headache    Penicillins Rash       CURRENT MEDICATIONS      Current Outpatient Medications   Medication Sig Dispense Refill    pantoprazole (PROTONIX) 40 MG tablet Take 1 tablet by mouth twice daily 60 tablet 1    ENBREL SURECLICK 50 MG/ML SOAJ INJECT 50 MG SUBCUTANEOUSLY EVERY WEEK 4 each 5    leflunomide (ARAVA) 10 MG tablet Take 1 tablet by mouth daily 30 tablet 0    Tuberculin-Allergy Syringes 28G X 1/2\" 1 ML MISC 1 each by Does not apply route once a week 12 each 3    naproxen (NAPROSYN) 375 MG tablet Take 1 tablet by mouth 2 times daily as needed for Pain 180 tablet 1    Clobetasol Propionate 0.05 % SHAM Apply topoically daily 118 mL 2    sucralfate (CARAFATE) 1 GM/10ML suspension Take 10 mLs by mouth 4 times daily 1200 mL 3     No current facility-administered medications for this visit. PHYSICAL EXAMINATION / OBJECTIVE   Objective: There were no vitals taken for this visit. Physical Exam  Vitals reviewed. Constitutional:       Appearance: She is well-developed. Cardiovascular:      Rate and Rhythm: Normal rate and regular rhythm. Pulmonary:      Effort: Pulmonary effort is normal.      Breath sounds: Normal breath sounds. Musculoskeletal:      Cervical back: Normal range of motion and neck supple. Skin:     General: Skin is warm and dry. Findings: Rash (plaques scalp, left foot) present. Neurological:      Mental Status: She is alert and oriented to person, place, and time. Psychiatric:         Thought Content:  Thought content normal.         ADAMS-28 (ESR): 4.26 (Moderate disease activity)      LABS    CBC  Lab Results   Component Value Date/Time    WBC 5.9 02/17/2022 02:41 PM    WBC 9.0 07/08/2021 10:07 AM    RBC 4.23 02/17/2022 02:41 PM    HGB 13.0 02/17/2022 02:41 PM    HCT 39.1 02/17/2022 02:41 PM    MCV 93 02/17/2022 02:41 PM    MCH 30.7 02/17/2022 02:41 PM    MCHC 33.1 02/17/2022 02:41 PM    RDW 13.8 02/17/2022 02:41 PM     02/17/2022 02:41 PM     07/08/2021 10:07 AM       CMP  Lab Results   Component Value Date/Time    CALCIUM 9.7 02/17/2022 02:41 PM    LABALBU 4.7 02/17/2022 02:41 PM    PROT 7.2 02/17/2022 02:41 PM     02/17/2022 02:41 PM    K 4.0 02/17/2022 02:41 PM    CO2 30 02/17/2022 02:41 PM     02/17/2022 02:41 PM    BUN 17 02/17/2022 02:41 PM    CREATININE 0.86 02/17/2022 02:41 PM    ALKPHOS 85 02/17/2022 02:41 PM    ALKPHOS 101 07/08/2021 10:07 AM    ALT 17 02/17/2022 02:41 PM    AST 22 02/17/2022 02:41 PM       HgBA1c: No components found for: HGBA1C    No results found for: VITD25      Lab Results   Component Value Date    ANASCRN None Detected 01/20/2021     Lab Results   Component Value Date    SSA SEE BELOW 01/20/2021     Lab Results   Component Value Date    SSB 25 01/20/2021     No results found for: ANTI-SMITH  No results found for: DSDNAAB   No results found for: ANTIRNP  No results found for: C3, C4  No results found for: CCPAB  Lab Results   Component Value Date    RF 10 01/20/2021       No components found for: CANCASCRN, APANCASCRN  Lab Results   Component Value Date    SEDRATE 6 02/17/2022     Lab Results   Component Value Date    CRP 0.9 (H) 02/17/2022       RADIOLOGY:         ASSESSMENT/PLAN    Assessment   Plan     Psoriatic arthritis  - arthralgia for years, + prolonged AM stiffness, improvement with NSAIDs. Intermittent diarrhea w/ reported hx of ulcerative colitis. Psoriasis. + fmhx of psoriasis: father, sister, son. Crohns in sister. Exam with synovitis, DIP nodules, posterior heel spurring  - prior tx: methotrexate 10 mg PO (GI upset), methotrexate 10 mg subcu (hair loss), humira (cont disease)  - avoiding il-17 b/c h/o ulcerative colitis   - stop enbrel 50 mg subcu weekly (8/2021) due to active disease   - restart arava 10 mg daily   - start tremfya 100 mg subcu q 8 weeks after loading dose. Side effects: URI, nasopharyngitis, headaches   - naproxen 375 mg BID    Psoriasis- scalp, left foot    Chronic midline low back pain without sciatica  - xray lumbar spine w/ bone spurring, SI joint sclerosis. Osteoarthritis bilat hands and feet   - continue naproxen 220 mg BID PRN    Medication monitoring   - CBC, CMP, sed rate, CRP q 4 weeks x3 w/ arava start      No follow-ups on file.         Electronically signed by JUAN DAVID Alfonso CNP on 9/19/2022 at 9:43 AM    New Prescriptions    No medications on file       Thank you for allowing me to participate in the care of this patient. Please call if there are any questions.

## 2022-09-21 NOTE — PROGRESS NOTES
[facility]  Dispensing Pharmacy: [address] [place] [address]    Anticipated Ship Date: 09/22/2022    Patient Copay: $0    Additional Notes: We have called the patient to confirm this info. Please call us with any questions at [phone].

## 2022-10-04 ENCOUNTER — TELEPHONE (OUTPATIENT)
Dept: FAMILY MEDICINE CLINIC | Age: 67
End: 2022-10-04

## 2022-10-04 NOTE — TELEPHONE ENCOUNTER
Patient completed a health care assessment and careplan has been updated through [facility] provider portal.      [facility] is also inviting Dr Judyth Snellen to attend rounds for this patient online on Oct 20th at 4:00pm.    Phone  option 3

## 2022-10-17 LAB
ALBUMIN SERPL-MCNC: 4.7 G/DL
ALP BLD-CCNC: 87 U/L
ALT SERPL-CCNC: 14 U/L
ANION GAP SERPL CALCULATED.3IONS-SCNC: 8 MMOL/L
AST SERPL-CCNC: 25 U/L
BASOPHILS ABSOLUTE: 11.8 /ΜL
BASOPHILS RELATIVE PERCENT: 0.9 %
BILIRUB SERPL-MCNC: 0.4 MG/DL (ref 0.1–1.4)
BUN BLDV-MCNC: 9 MG/DL
C-REACTIVE PROTEIN: 0.8
CALCIUM SERPL-MCNC: 9.3 MG/DL
CHLORIDE BLD-SCNC: 103 MMOL/L
CO2: 28 MMOL/L
CREAT SERPL-MCNC: 0.83 MG/DL
EOSINOPHILS ABSOLUTE: 0.04 /ΜL
EOSINOPHILS RELATIVE PERCENT: 1.6 %
GFR CALCULATED: 78
GLUCOSE BLD-MCNC: 86 MG/DL
HCT VFR BLD CALC: 41.5 % (ref 36–46)
HEMOGLOBIN: 13.7 G/DL (ref 12–16)
LYMPHOCYTES ABSOLUTE: 1.76 /ΜL
LYMPHOCYTES RELATIVE PERCENT: 41.3 %
MCH RBC QN AUTO: 30.4 PG
MCHC RBC AUTO-ENTMCNC: 33 G/DL
MCV RBC AUTO: 92 FL
MONOCYTES ABSOLUTE: 0.41 /ΜL
MONOCYTES RELATIVE PERCENT: 9.6 %
NEUTROPHILS ABSOLUTE: 1.97 /ΜL
NEUTROPHILS RELATIVE PERCENT: 46.4 %
PDW BLD-RTO: 13 %
PLATELET # BLD: NORMAL 10*3/UL
PMV BLD AUTO: 11.8 FL
POTASSIUM SERPL-SCNC: 4.1 MMOL/L
RBC # BLD: 4.51 10^6/ΜL
SEDIMENTATION RATE, ERYTHROCYTE: 9
SODIUM BLD-SCNC: 139 MMOL/L
TOTAL PROTEIN: 6.9
WBC # BLD: 4.3 10^3/ML

## 2022-10-18 ENCOUNTER — TELEPHONE (OUTPATIENT)
Dept: RHEUMATOLOGY | Age: 67
End: 2022-10-18

## 2022-10-18 RX ORDER — LEFLUNOMIDE 10 MG/1
10 TABLET ORAL DAILY
Qty: 30 TABLET | Refills: 0 | Status: SHIPPED | OUTPATIENT
Start: 2022-10-18

## 2022-10-18 NOTE — TELEPHONE ENCOUNTER
----- Message from Jamal Riedel, APRN - CNP sent at 10/18/2022 10:24 AM EDT -----  Blood testing with mildly elevated inflammatory marker, but no other significant abnormalities. [address] refilled. Repeat labs in 4 weeks x2.

## 2022-10-27 ENCOUNTER — OFFICE VISIT (OUTPATIENT)
Dept: FAMILY MEDICINE CLINIC | Age: 67
End: 2022-10-27

## 2022-10-27 VITALS
WEIGHT: 164.8 LBS | SYSTOLIC BLOOD PRESSURE: 136 MMHG | DIASTOLIC BLOOD PRESSURE: 82 MMHG | RESPIRATION RATE: 12 BRPM | HEART RATE: 76 BPM | HEIGHT: 67 IN | BODY MASS INDEX: 25.87 KG/M2

## 2022-10-27 DIAGNOSIS — K21.00 CHRONIC REFLUX ESOPHAGITIS: Primary | ICD-10-CM

## 2022-10-27 DIAGNOSIS — L40.9 PSORIASIS: ICD-10-CM

## 2022-10-27 DIAGNOSIS — K52.9 IBD (INFLAMMATORY BOWEL DISEASE): ICD-10-CM

## 2022-10-27 PROCEDURE — 99213 OFFICE O/P EST LOW 20 MIN: CPT | Performed by: EMERGENCY MEDICINE

## 2022-10-27 PROCEDURE — 1123F ACP DISCUSS/DSCN MKR DOCD: CPT | Performed by: EMERGENCY MEDICINE

## 2022-10-27 RX ORDER — PANTOPRAZOLE SODIUM 40 MG/1
TABLET, DELAYED RELEASE ORAL
Qty: 180 TABLET | Refills: 1 | Status: SHIPPED | OUTPATIENT
Start: 2022-10-27

## 2022-10-27 RX ORDER — GLUCOSAMINE/D3/BOSWELLIA SERRA 1500MG-400
2 TABLET ORAL DAILY
COMMUNITY

## 2022-10-27 SDOH — ECONOMIC STABILITY: FOOD INSECURITY: WITHIN THE PAST 12 MONTHS, THE FOOD YOU BOUGHT JUST DIDN'T LAST AND YOU DIDN'T HAVE MONEY TO GET MORE.: NEVER TRUE

## 2022-10-27 SDOH — ECONOMIC STABILITY: FOOD INSECURITY: WITHIN THE PAST 12 MONTHS, YOU WORRIED THAT YOUR FOOD WOULD RUN OUT BEFORE YOU GOT MONEY TO BUY MORE.: NEVER TRUE

## 2022-10-27 ASSESSMENT — ENCOUNTER SYMPTOMS
SHORTNESS OF BREATH: 0
CHEST TIGHTNESS: 0
COUGH: 0
RHINORRHEA: 0
BACK PAIN: 0
TROUBLE SWALLOWING: 0
NAUSEA: 0
CONSTIPATION: 0
ABDOMINAL PAIN: 0
VOICE CHANGE: 0
SINUS PRESSURE: 0
WHEEZING: 0
DIARRHEA: 0
SORE THROAT: 0
VOMITING: 0

## 2022-10-27 ASSESSMENT — SOCIAL DETERMINANTS OF HEALTH (SDOH): HOW HARD IS IT FOR YOU TO PAY FOR THE VERY BASICS LIKE FOOD, HOUSING, MEDICAL CARE, AND HEATING?: NOT VERY HARD

## 2022-11-21 ENCOUNTER — OFFICE VISIT (OUTPATIENT)
Dept: RHEUMATOLOGY | Age: 67
End: 2022-11-21
Payer: MEDICARE

## 2022-11-21 VITALS
WEIGHT: 161.6 LBS | HEIGHT: 67 IN | SYSTOLIC BLOOD PRESSURE: 130 MMHG | OXYGEN SATURATION: 97 % | DIASTOLIC BLOOD PRESSURE: 76 MMHG | HEART RATE: 87 BPM | BODY MASS INDEX: 25.36 KG/M2

## 2022-11-21 DIAGNOSIS — M54.50 CHRONIC MIDLINE LOW BACK PAIN WITHOUT SCIATICA: ICD-10-CM

## 2022-11-21 DIAGNOSIS — G89.29 CHRONIC MIDLINE LOW BACK PAIN WITHOUT SCIATICA: ICD-10-CM

## 2022-11-21 DIAGNOSIS — L40.9 PSORIASIS: ICD-10-CM

## 2022-11-21 DIAGNOSIS — L40.50 PSA (PSORIATIC ARTHRITIS) (HCC): Primary | ICD-10-CM

## 2022-11-21 DIAGNOSIS — Z51.81 MEDICATION MONITORING ENCOUNTER: ICD-10-CM

## 2022-11-21 DIAGNOSIS — M15.9 OSTEOARTHRITIS OF MULTIPLE JOINTS, UNSPECIFIED OSTEOARTHRITIS TYPE: ICD-10-CM

## 2022-11-21 PROCEDURE — 99214 OFFICE O/P EST MOD 30 MIN: CPT | Performed by: NURSE PRACTITIONER

## 2022-11-21 PROCEDURE — 1123F ACP DISCUSS/DSCN MKR DOCD: CPT | Performed by: NURSE PRACTITIONER

## 2022-11-21 ASSESSMENT — ENCOUNTER SYMPTOMS
EYE ITCHING: 0
COUGH: 0
EYE PAIN: 0
BACK PAIN: 1
CONSTIPATION: 0
ABDOMINAL PAIN: 1
NAUSEA: 0
DIARRHEA: 1
SHORTNESS OF BREATH: 0
TROUBLE SWALLOWING: 0

## 2022-11-21 NOTE — PROGRESS NOTES
[facility] RHEUMATOLOGY FOLLOW UP NOTE       Date Of Service: 11/21/2022  Provider: Quentin Domingo, APRN - CNP    Name: Danuta Roldan   MRN: [id number]    Chief Complaint(s)     Chief Complaint   Patient presents with    Follow-up     2 month         History of Present Illness (HPI)     Danuta Roldan  is a(n)67 y.o. female with a hx of anemia, chronic gastritis, reflux esophagitis, compression fracture, depression, diarrhea, pelvic fracture, GI bleed, hx of colon polyps, psoriasis, wt loss here for the f/u evaluation of psoriasis arthritis, psoriasis     Interval hx:    - started tremfya, has had 1 loading doses    pain affecting the right fingers, neck, mid back, left hip  Pain on a scale 0-10: 4/10  Type of pain: intermittent  Timing: mornings  Aggravating factors: lower ext/back: worse with prolonged standing. Hands/wrists: increased use  Alleviating factors: n/a    Associated symptoms:  denies swelling/  Redness/ warmth, + AM stiffness lasting ~ 30 minutes    + psoriasis scalp      REVIEW OF SYSTEMS: (ROS)    Review of Systems   Constitutional:  Negative for fatigue, fever and unexpected weight change. HENT:  Negative for congestion and trouble swallowing. Hair loss   Eyes:  Negative for pain and itching. Dry eyes   Respiratory:  Negative for cough and shortness of breath. Cardiovascular:  Negative for chest pain and leg swelling. Gastrointestinal:  Positive for abdominal pain and diarrhea. Negative for constipation and nausea. Endocrine: Negative for cold intolerance and heat intolerance. Genitourinary:  Negative for difficulty urinating, frequency and urgency. Musculoskeletal:  Positive for arthralgias, back pain and neck pain. Negative for joint swelling. Skin:  Positive for rash. Neurological:  Positive for headaches. Negative for dizziness, weakness and numbness. Psychiatric/Behavioral:  The patient is not nervous/anxious.       PAST MEDICAL HISTORY      Past Medical History:   Diagnosis Date    Anemia 4/3/14    Chronic gastritis 4/22/14    Chronic reflux esophagitis 4/22/14    Compression fracture     Depression     Diarrhea     Fracture     compression    Fracture of pelvis ([address] 2008    GI bleed     History of colon polyps     Psoriasis     scalp    Weight loss        PAST SURGICAL HISTORY      Past Surgical History:   Procedure Laterality Date    CARPAL TUNNEL RELEASE      bilateral    COLONOSCOPY  2010    DILATION AND CURETTAGE OF UTERUS      OTHER SURGICAL HISTORY  July 2014    \"nodes\" removed from left armpit - Dr. Echo Vang History   Problem Relation Age of Onset    Heart Disease Mother     High Blood Pressure Mother     Psoriasis Father     Colon Cancer Sister     Psoriasis Sister     Crohn's Disease Sister     Crohn's Disease Sister        SOCIAL HISTORY      Social History       Tobacco History       Smoking Status  Former Smoker Quit date  9/26/2001 Smoking Frequency  0 packs/day for 0 years (0 pk yrs) Smoking Tobacco Type  Cigarettes      Smokeless Tobacco Use  Never Used              Alcohol History       Alcohol Use Status  No              Drug Use       Drug Use Status  No              Sexual Activity       Sexually Active  Never                    ALLERGIES     Allergies   Allergen Reactions    Codeine Nausea And Vomiting and Other (See Comments)     headache    Penicillins Rash       CURRENT MEDICATIONS      Current Outpatient Medications   Medication Sig Dispense Refill    Biotin 83034 MCG TABS Take 2 tablets by mouth daily      Emollient (COLLAGEN EX) Apply topically daily      pantoprazole (PROTONIX) 40 MG tablet Take 1 tablet by mouth twice daily 180 tablet 1    leflunomide (ARAVA) 10 MG tablet Take 1 tablet by mouth daily 30 tablet 0    guselkumab (TREMFYA) 100 MG/ML SOSY injection Inject 100 mg subcu on weeks 0 and 4, then every 8 weeks threafter.  1 each 5    sucralfate (CARAFATE) 1 GM/10ML suspension Take 10 mLs by mouth 4 times daily (Patient taking differently: Take 1 g by mouth as needed) 1200 mL 3     No current facility-administered medications for this visit. PHYSICAL EXAMINATION / OBJECTIVE   Objective:  /76 (Site: Left Upper Arm, Position: Sitting, Cuff Size: Medium Adult)   Pulse 87   Ht 5' 7.01\" (1.702 m)   Wt 161 lb 9.6 oz (73.3 kg)   SpO2 97%   BMI 25.30 kg/m²     Physical Exam  Vitals reviewed. Constitutional:       Appearance: She is well-developed. Cardiovascular:      Rate and Rhythm: Normal rate and regular rhythm. Pulmonary:      Effort: Pulmonary effort is normal.      Breath sounds: Normal breath sounds. Musculoskeletal:      Cervical back: Normal range of motion and neck supple. Skin:     General: Skin is warm and dry. Findings: Rash (large plaque posterior scalp) present. Neurological:      Mental Status: She is alert and oriented to person, place, and time. Psychiatric:         Thought Content:  Thought content normal.     Upper extremities:    SHOULDERS nontender, no swelling ,   ELBOWS nontender, no swelling,   WRISTS nontender  HANDS/FINGERS tender & bogginess bilat PIPs     Lower extremities:  HIPS + tender left outer hips  KNEES + tender bilat, no swelling  ANKLES nontender  FEET : + MTP compression bilat     LABS    CBC  Lab Results   Component Value Date/Time    WBC 4.3 10/17/2022 12:00 AM    RBC 4.51 10/17/2022 12:00 AM    RBC 4.23 02/17/2022 02:41 PM    HGB 13.7 10/17/2022 12:00 AM    HCT 41.5 10/17/2022 12:00 AM    MCV 92.0 10/17/2022 12:00 AM    MCH 30.4 10/17/2022 12:00 AM    MCHC 33.0 10/17/2022 12:00 AM    RDW 13.0 10/17/2022 12:00 AM     02/17/2022 02:41 PM     07/08/2021 10:07 AM       CMP  Lab Results   Component Value Date/Time    CALCIUM 9.3 10/17/2022 12:00 AM    LABALBU 4.7 10/17/2022 12:00 AM    PROT 7.2 02/17/2022 02:41 PM     10/17/2022 12:00 AM    K 4.1 10/17/2022 12:00 AM    CO2 28 10/17/2022 12:00 AM     10/17/2022 12:00 AM    BUN 9 10/17/2022 12:00 AM    CREATININE 0.83 10/17/2022 12:00 AM    ALKPHOS 87 10/17/2022 12:00 AM    ALT 14 10/17/2022 12:00 AM    AST 25 10/17/2022 12:00 AM       HgBA1c: No components found for: HGBA1C    No results found for: VITD25      Lab Results   Component Value Date    ANASCRN None Detected 01/20/2021     Lab Results   Component Value Date    SSA SEE BELOW 01/20/2021     Lab Results   Component Value Date    SSB 25 01/20/2021     No results found for: ANTI-SMITH  No results found for: DSDNAAB   No results found for: ANTIRNP  No results found for: C3, C4  No results found for: CCPAB  Lab Results   Component Value Date    RF 10 01/20/2021       No components found for: CANCASCRN, APANCASCRN  Lab Results   Component Value Date    SEDRATE 9 10/17/2022     Lab Results   Component Value Date    CRP 0.8 10/17/2022       RADIOLOGY:         ASSESSMENT/PLAN    Assessment   Plan     Psoriatic arthritis  - arthralgia for years, + prolonged AM stiffness, improvement with NSAIDs. Intermittent diarrhea w/ reported hx of ulcerative colitis. Psoriasis. + fmhx of psoriasis: father, sister, son. Crohns in sister. Exam with synovitis, DIP nodules, posterior heel spurring  - prior tx: methotrexate 10 mg PO (GI upset), methotrexate 10 mg subcu (hair loss), humira (cont disease), enbrel (cont disease)  - avoiding il-17 b/c h/o ulcerative colitis   - arava 10 mg daily   - tremfya 100 mg subcu q 8 weeks (9/2022)    Psoriasis- scalp    Chronic midline low back pain without sciatica  - xray lumbar spine w/ bone spurring, SI joint sclerosis. Osteoarthritis bilat hands and feet   - continue naproxen 220 mg BID PRN    Medication monitoring   - CBC, CMP, sed rate, CRP q 4 weeks x1      No follow-ups on file. Electronically signed by JUAN DAVID Ortega - CNP on 11/21/2022 at 12:27 PM    New Prescriptions    No medications on file       Thank you for allowing me to participate in the care of this patient. Please call if there are any questions.

## 2022-11-22 ENCOUNTER — TELEPHONE (OUTPATIENT)
Dept: RHEUMATOLOGY | Age: 67
End: 2022-11-22

## 2022-11-22 LAB
ABSOLUTE BASO #: 0.04 K/UL (ref 0–0.2)
ABSOLUTE EOS #: 0.07 K/UL (ref 0–0.5)
ABSOLUTE LYMPH #: 1.54 K/UL (ref 1–4)
ABSOLUTE MONO #: 0.4 K/UL (ref 0.2–1)
ABSOLUTE NEUT #: 2.73 K/UL (ref 1.5–7.5)
ALBUMIN SERPL-MCNC: 4.7 G/DL (ref 3.5–5.2)
ALK PHOSPHATASE: 82 U/L (ref 40–140)
ALT SERPL-CCNC: 17 U/L (ref 5–40)
ANION GAP SERPL CALCULATED.3IONS-SCNC: 11 MEQ/L (ref 7–16)
AST SERPL-CCNC: 22 U/L (ref 9–40)
BASOPHILS RELATIVE PERCENT: 0.8 %
BILIRUB SERPL-MCNC: 0.4 MG/DL
BUN BLDV-MCNC: 9 MG/DL (ref 8–23)
C-REACTIVE PROTEIN: 0.3 MG/DL
CALCIUM SERPL-MCNC: 9.4 MG/DL (ref 8.5–10.5)
CHLORIDE BLD-SCNC: 102 MEQ/L (ref 95–107)
CO2: 28 MEQ/L (ref 19–31)
CREAT SERPL-MCNC: 0.78 MG/DL (ref 0.6–1.3)
EGFR IF NONAFRICAN AMERICAN: 84 ML/MIN/1.73
EOSINOPHILS RELATIVE PERCENT: 1.5 %
GLUCOSE: 67 MG/DL (ref 70–99)
HCT VFR BLD CALC: 43.5 % (ref 34–45)
HEMOGLOBIN: 14.5 G/DL (ref 11.5–15.5)
LYMPHOCYTE %: 32.2 %
MCH RBC QN AUTO: 30.5 PG (ref 25–33)
MCHC RBC AUTO-ENTMCNC: 33.3 G/DL (ref 31–36)
MCV RBC AUTO: 91.4 FL (ref 80–99)
MONOCYTES # BLD: 8.4 %
NEUTROPHILS RELATIVE PERCENT: 56.9 %
PDW BLD-RTO: 13.3 % (ref 11.5–15)
PLATELETS: 221 K/UL (ref 130–400)
PMV BLD AUTO: 11.8 FL (ref 9.3–13)
POTASSIUM SERPL-SCNC: 4 MEQ/L (ref 3.5–5.4)
RBC: 4.76 M/UL (ref 3.8–5.4)
SEDIMENTATION RATE, ERYTHROCYTE: 12 MM/HR (ref 0–20)
SODIUM BLD-SCNC: 141 MEQ/L (ref 133–146)
TOTAL PROTEIN: 6.5 G/DL (ref 6.1–8.3)
WBC: 4.8 K/UL (ref 3.5–11)

## 2022-11-22 RX ORDER — LEFLUNOMIDE 10 MG/1
10 TABLET ORAL DAILY
Qty: 30 TABLET | Refills: 0 | Status: SHIPPED | OUTPATIENT
Start: 2022-11-22

## 2022-11-22 NOTE — TELEPHONE ENCOUNTER
----- Message from Filippo Nixon DO sent at 11/22/2022  5:48 AM EST -----  The labs are stable compared with the prior evaluation. Please continue current medications and have your labs repeated in 4 weeks.

## 2022-11-28 ENCOUNTER — TELEPHONE (OUTPATIENT)
Dept: FAMILY MEDICINE CLINIC | Age: 67
End: 2022-11-28

## 2022-11-28 NOTE — TELEPHONE ENCOUNTER
Please let her know that I understand that she thinks she was not around anyone with COVID but it still can be as we don't know who has it and who doesn't sometimes. Unless she has not seen anyone whatsoever or go out of her house for nothing in the last 10 days.

## 2022-11-28 NOTE — TELEPHONE ENCOUNTER
Covid cases are increasing and it is hard to tell what the Dx is. I just want to make sure she does not get antibiotics that she does not need. Please let us know if she is not better or if worse.

## 2022-11-28 NOTE — TELEPHONE ENCOUNTER
Pt called said she is having head congestion, has yellow mucous, some cough, runny nose sometimes also tired sometimes. Gets this every year at this time. She is wanting an antibiotic. Started last Wednesday. No fever, no body aches, no nausea or vomiting, no SOB, no sore throat, no headache. Antoine Socks does not have Covid\". Said she hasn't been around anybody. Refused appt.     [id number] [place] [address]

## 2022-12-20 RX ORDER — LEFLUNOMIDE 10 MG/1
TABLET ORAL
Qty: 30 TABLET | Refills: 0 | OUTPATIENT
Start: 2022-12-20

## 2023-01-12 DIAGNOSIS — Z51.81 MEDICATION MONITORING ENCOUNTER: ICD-10-CM

## 2023-01-12 DIAGNOSIS — L40.50 PSA (PSORIATIC ARTHRITIS) (HCC): Primary | ICD-10-CM

## 2023-01-12 RX ORDER — LEFLUNOMIDE 10 MG/1
10 TABLET ORAL DAILY
Qty: 30 TABLET | Refills: 1 | Status: SHIPPED | OUTPATIENT
Start: 2023-01-12

## 2023-01-13 ENCOUNTER — TELEPHONE (OUTPATIENT)
Dept: RHEUMATOLOGY | Age: 68
End: 2023-01-13

## 2023-01-13 NOTE — TELEPHONE ENCOUNTER
----- Message from Giovany Thompson DO sent at 1/12/2023  5:03 PM EST -----  Labs with a mildly elevated C-reactive protein which can indicate some active inflammation. The remainder labs are clinically stable.   Please have blood test repeated in 8 weeks

## 2023-02-16 ENCOUNTER — TELEPHONE (OUTPATIENT)
Dept: RHEUMATOLOGY | Age: 68
End: 2023-02-16

## 2023-02-16 NOTE — TELEPHONE ENCOUNTER
----- Message from France Forrest DO sent at 2/15/2023  7:34 PM EST -----  The labs are stable compared with the prior evaluation. Please continue current medications and have your labs repeated in 8 weeks.

## 2023-04-25 ENCOUNTER — OFFICE VISIT (OUTPATIENT)
Dept: RHEUMATOLOGY | Age: 68
End: 2023-04-25
Payer: MEDICAID

## 2023-04-25 VITALS
WEIGHT: 166.8 LBS | DIASTOLIC BLOOD PRESSURE: 78 MMHG | BODY MASS INDEX: 26.18 KG/M2 | OXYGEN SATURATION: 99 % | HEART RATE: 78 BPM | HEIGHT: 67 IN | SYSTOLIC BLOOD PRESSURE: 116 MMHG

## 2023-04-25 DIAGNOSIS — M15.9 OSTEOARTHRITIS OF MULTIPLE JOINTS, UNSPECIFIED OSTEOARTHRITIS TYPE: ICD-10-CM

## 2023-04-25 DIAGNOSIS — L40.50 PSA (PSORIATIC ARTHRITIS) (HCC): Primary | ICD-10-CM

## 2023-04-25 DIAGNOSIS — M54.50 CHRONIC MIDLINE LOW BACK PAIN WITHOUT SCIATICA: ICD-10-CM

## 2023-04-25 DIAGNOSIS — G89.29 CHRONIC MIDLINE LOW BACK PAIN WITHOUT SCIATICA: ICD-10-CM

## 2023-04-25 DIAGNOSIS — Z51.81 MEDICATION MONITORING ENCOUNTER: ICD-10-CM

## 2023-04-25 DIAGNOSIS — L40.9 PSORIASIS: ICD-10-CM

## 2023-04-25 LAB
ABSOLUTE BASO #: 0.04 K/UL (ref 0–0.2)
ABSOLUTE EOS #: 0.07 K/UL (ref 0–0.5)
ABSOLUTE LYMPH #: 1.59 K/UL (ref 1–4)
ABSOLUTE MONO #: 0.43 K/UL (ref 0.2–1)
ABSOLUTE NEUT #: 3.06 K/UL (ref 1.5–7.5)
ALBUMIN SERPL-MCNC: 5.1 G/DL (ref 3.5–5.2)
ALK PHOSPHATASE: 100 U/L (ref 40–142)
ALT SERPL-CCNC: 10 U/L (ref 5–40)
ANION GAP SERPL CALCULATED.3IONS-SCNC: 11 MEQ/L (ref 7–16)
AST SERPL-CCNC: 19 U/L (ref 9–40)
BASOPHILS RELATIVE PERCENT: 0.8 %
BILIRUB SERPL-MCNC: 0.4 MG/DL
BUN BLDV-MCNC: 10 MG/DL (ref 8–23)
C-REACTIVE PROTEIN: 0.4 MG/DL
CALCIUM SERPL-MCNC: 10.1 MG/DL (ref 8.5–10.5)
CHLORIDE BLD-SCNC: 103 MEQ/L (ref 95–107)
CO2: 28 MEQ/L (ref 19–31)
CREAT SERPL-MCNC: 0.95 MG/DL (ref 0.6–1.3)
EGFR IF NONAFRICAN AMERICAN: 66 ML/MIN/1.73
EOSINOPHILS RELATIVE PERCENT: 1.3 %
GLUCOSE: 87 MG/DL (ref 70–99)
HCT VFR BLD CALC: 43.9 % (ref 34–45)
HEMOGLOBIN: 14.9 G/DL (ref 11.5–15.5)
LYMPHOCYTE %: 30.5 %
MCH RBC QN AUTO: 30.7 PG (ref 25–33)
MCHC RBC AUTO-ENTMCNC: 33.9 G/DL (ref 31–36)
MCV RBC AUTO: 90.3 FL (ref 80–99)
MONOCYTES # BLD: 8.3 %
NEUTROPHILS RELATIVE PERCENT: 58.7 %
PDW BLD-RTO: 13 % (ref 11.5–15)
PLATELETS: 230 K/UL (ref 130–400)
PMV BLD AUTO: 10.9 FL (ref 9.3–13)
POTASSIUM SERPL-SCNC: 4.6 MEQ/L (ref 3.5–5.4)
RBC: 4.86 M/UL (ref 3.8–5.4)
SEDIMENTATION RATE, ERYTHROCYTE: 10 MM/HR (ref 0–20)
SODIUM BLD-SCNC: 142 MEQ/L (ref 133–146)
TOTAL PROTEIN: 7.1 G/DL (ref 6.1–8.3)
WBC: 5.2 K/UL (ref 3.5–11)

## 2023-04-25 PROCEDURE — 99214 OFFICE O/P EST MOD 30 MIN: CPT | Performed by: INTERNAL MEDICINE

## 2023-04-25 PROCEDURE — 1123F ACP DISCUSS/DSCN MKR DOCD: CPT | Performed by: INTERNAL MEDICINE

## 2023-04-25 RX ORDER — CLOBETASOL PROPIONATE 0.05 G/100ML
SHAMPOO TOPICAL
Qty: 118 ML | Refills: 2 | Status: SHIPPED | OUTPATIENT
Start: 2023-04-25

## 2023-04-25 RX ORDER — LEFLUNOMIDE 20 MG/1
10 TABLET ORAL DAILY
Qty: 30 TABLET | Refills: 0 | Status: SHIPPED | OUTPATIENT
Start: 2023-04-25

## 2023-04-25 ASSESSMENT — ENCOUNTER SYMPTOMS
COUGH: 0
NAUSEA: 0
CONSTIPATION: 0
ABDOMINAL PAIN: 1
DIARRHEA: 1
EYE PAIN: 0
SHORTNESS OF BREATH: 0
BACK PAIN: 1
EYE ITCHING: 0
TROUBLE SWALLOWING: 0

## 2023-04-26 ENCOUNTER — TELEPHONE (OUTPATIENT)
Dept: RHEUMATOLOGY | Age: 68
End: 2023-04-26

## 2023-04-26 NOTE — TELEPHONE ENCOUNTER
----- Message from JUAN DAVID Montiel CNP sent at 4/26/2023  8:32 AM EDT -----  Blood testing with no significant abnormalities. Repeat labs in 4 weeks x3 with arava increase.

## 2023-04-27 ENCOUNTER — OFFICE VISIT (OUTPATIENT)
Dept: FAMILY MEDICINE CLINIC | Age: 68
End: 2023-04-27

## 2023-04-27 VITALS
HEIGHT: 67 IN | BODY MASS INDEX: 26.53 KG/M2 | RESPIRATION RATE: 12 BRPM | SYSTOLIC BLOOD PRESSURE: 136 MMHG | HEART RATE: 72 BPM | DIASTOLIC BLOOD PRESSURE: 82 MMHG | WEIGHT: 169 LBS

## 2023-04-27 DIAGNOSIS — Z12.31 ENCOUNTER FOR SCREENING MAMMOGRAM FOR MALIGNANT NEOPLASM OF BREAST: ICD-10-CM

## 2023-04-27 DIAGNOSIS — L40.9 PSORIASIS: ICD-10-CM

## 2023-04-27 DIAGNOSIS — K21.00 CHRONIC REFLUX ESOPHAGITIS: Primary | ICD-10-CM

## 2023-04-27 DIAGNOSIS — Z78.0 POSTMENOPAUSAL: ICD-10-CM

## 2023-04-27 PROBLEM — M05.9 RHEUMATOID ARTHRITIS WITH RHEUMATOID FACTOR, UNSPECIFIED (HCC): Status: ACTIVE | Noted: 2023-04-27

## 2023-04-27 PROBLEM — M06.9 RHEUMATOID ARTHRITIS, UNSPECIFIED (HCC): Status: ACTIVE | Noted: 2023-04-27

## 2023-04-27 RX ORDER — PANTOPRAZOLE SODIUM 40 MG/1
TABLET, DELAYED RELEASE ORAL
Qty: 180 TABLET | Refills: 1 | Status: SHIPPED | OUTPATIENT
Start: 2023-04-27

## 2023-04-27 SDOH — ECONOMIC STABILITY: HOUSING INSECURITY
IN THE LAST 12 MONTHS, WAS THERE A TIME WHEN YOU DID NOT HAVE A STEADY PLACE TO SLEEP OR SLEPT IN A SHELTER (INCLUDING NOW)?: NO

## 2023-04-27 SDOH — ECONOMIC STABILITY: INCOME INSECURITY: HOW HARD IS IT FOR YOU TO PAY FOR THE VERY BASICS LIKE FOOD, HOUSING, MEDICAL CARE, AND HEATING?: NOT HARD AT ALL

## 2023-04-27 SDOH — ECONOMIC STABILITY: FOOD INSECURITY: WITHIN THE PAST 12 MONTHS, YOU WORRIED THAT YOUR FOOD WOULD RUN OUT BEFORE YOU GOT MONEY TO BUY MORE.: NEVER TRUE

## 2023-04-27 SDOH — ECONOMIC STABILITY: FOOD INSECURITY: WITHIN THE PAST 12 MONTHS, THE FOOD YOU BOUGHT JUST DIDN'T LAST AND YOU DIDN'T HAVE MONEY TO GET MORE.: NEVER TRUE

## 2023-04-27 ASSESSMENT — ENCOUNTER SYMPTOMS
VOICE CHANGE: 0
TROUBLE SWALLOWING: 0
RHINORRHEA: 0
CHEST TIGHTNESS: 0
CONSTIPATION: 0
ABDOMINAL PAIN: 0
BACK PAIN: 0
COUGH: 0
DIARRHEA: 0
NAUSEA: 0
WHEEZING: 0
SINUS PRESSURE: 0
SHORTNESS OF BREATH: 0
SORE THROAT: 0
VOMITING: 0

## 2023-04-27 ASSESSMENT — PATIENT HEALTH QUESTIONNAIRE - PHQ9
9. THOUGHTS THAT YOU WOULD BE BETTER OFF DEAD, OR OF HURTING YOURSELF: 0
SUM OF ALL RESPONSES TO PHQ QUESTIONS 1-9: 0
SUM OF ALL RESPONSES TO PHQ QUESTIONS 1-9: 0
7. TROUBLE CONCENTRATING ON THINGS, SUCH AS READING THE NEWSPAPER OR WATCHING TELEVISION: 0
SUM OF ALL RESPONSES TO PHQ QUESTIONS 1-9: 0
5. POOR APPETITE OR OVEREATING: 0
SUM OF ALL RESPONSES TO PHQ9 QUESTIONS 1 & 2: 0
3. TROUBLE FALLING OR STAYING ASLEEP: 0
1. LITTLE INTEREST OR PLEASURE IN DOING THINGS: 0
8. MOVING OR SPEAKING SO SLOWLY THAT OTHER PEOPLE COULD HAVE NOTICED. OR THE OPPOSITE, BEING SO FIGETY OR RESTLESS THAT YOU HAVE BEEN MOVING AROUND A LOT MORE THAN USUAL: 0
SUM OF ALL RESPONSES TO PHQ QUESTIONS 1-9: 0
10. IF YOU CHECKED OFF ANY PROBLEMS, HOW DIFFICULT HAVE THESE PROBLEMS MADE IT FOR YOU TO DO YOUR WORK, TAKE CARE OF THINGS AT HOME, OR GET ALONG WITH OTHER PEOPLE: 0
2. FEELING DOWN, DEPRESSED OR HOPELESS: 0
4. FEELING TIRED OR HAVING LITTLE ENERGY: 0
6. FEELING BAD ABOUT YOURSELF - OR THAT YOU ARE A FAILURE OR HAVE LET YOURSELF OR YOUR FAMILY DOWN: 0

## 2023-04-27 NOTE — PROGRESS NOTES
Date: 4/27/2023    :    Regina Sommers is a 79 y. o.female who presents today for:  Chief Complaint   Patient presents with    Depression    Inflammatory Bowel Disease         HPI:     Patient is here for checkup and refill of medications. Seeing Dr Dmitry Velasquez rheumatologist for her psoriasis and psoriatic arthritis patient's been doing well with it    Patient is looking forward to work as a       CurrentHome Medications were reviewed and updated today by this Provider  Current Outpatient Medications   Medication Sig Dispense Refill    pantoprazole (PROTONIX) 40 MG tablet Take 1 tablet by mouth twice daily 180 tablet 1    Clobetasol Propionate 0.05 % SHAM Apply nightly for active scalp psoriasis 118 mL 2    leflunomide (ARAVA) 20 MG tablet Take 0.5 tablets by mouth daily 30 tablet 0    Biotin 99203 MCG TABS Take 2 tablets by mouth daily      Emollient (COLLAGEN EX) Apply topically daily      guselkumab (TREMFYA) 100 MG/ML SOSY injection Inject 100 mg subcu on weeks 0 and 4, then every 8 weeks threafter. 1 each 5    sucralfate (CARAFATE) 1 GM/10ML suspension Take 10 mLs by mouth 4 times daily (Patient taking differently: Take 10 mLs by mouth as needed) 1200 mL 3     No current facility-administered medications for this visit. Subjective:      Review of Systems   Constitutional:  Negative for appetite change, chills, diaphoresis, fatigue and fever. HENT:  Negative for congestion, ear pain, postnasal drip, rhinorrhea, sinus pressure, sneezing, sore throat, trouble swallowing and voice change. Respiratory:  Negative for cough, chest tightness, shortness of breath and wheezing. Cardiovascular:  Negative for chest pain, palpitations and leg swelling. Gastrointestinal:  Negative for abdominal pain, constipation, diarrhea, nausea and vomiting. Musculoskeletal:  Positive for arthralgias. Negative for back pain, joint swelling, myalgias, neck pain and neck stiffness. Skin:  Positive for rash.

## 2023-05-02 ENCOUNTER — TELEPHONE (OUTPATIENT)
Dept: RHEUMATOLOGY | Age: 68
End: 2023-05-02

## 2023-05-02 DIAGNOSIS — L40.50 PSA (PSORIATIC ARTHRITIS) (HCC): ICD-10-CM

## 2023-05-02 RX ORDER — LEFLUNOMIDE 20 MG/1
20 TABLET ORAL DAILY
Qty: 30 TABLET | Refills: 0 | Status: SHIPPED | OUTPATIENT
Start: 2023-05-02 | End: 2023-06-08 | Stop reason: SDUPTHER

## 2023-05-02 NOTE — TELEPHONE ENCOUNTER
Pt called in and LM asking us to clarify script she received for leflunomide. Script was 20 mg tab take 1/2 tab daily for a 10 mg dose however office note states increase to 20 mg daily.

## 2023-06-07 LAB
ABSOLUTE BASO #: 0.03 K/UL (ref 0–0.2)
ABSOLUTE EOS #: 0.06 K/UL (ref 0–0.5)
ABSOLUTE LYMPH #: 1.58 K/UL (ref 1–4)
ABSOLUTE MONO #: 0.36 K/UL (ref 0.2–1)
ABSOLUTE NEUT #: 2.37 K/UL (ref 1.5–7.5)
ALBUMIN SERPL-MCNC: 4.8 G/DL (ref 3.5–5.2)
ALK PHOSPHATASE: 87 U/L (ref 40–142)
ALT SERPL-CCNC: 14 U/L (ref 5–40)
ANION GAP SERPL CALCULATED.3IONS-SCNC: 8 MEQ/L (ref 7–16)
AST SERPL-CCNC: 21 U/L (ref 9–40)
BASOPHILS RELATIVE PERCENT: 0.7 %
BILIRUB SERPL-MCNC: 0.3 MG/DL
BUN BLDV-MCNC: 8 MG/DL (ref 8–23)
C-REACTIVE PROTEIN: <0.3 MG/DL
CALCIUM SERPL-MCNC: 9 MG/DL (ref 8.5–10.5)
CHLORIDE BLD-SCNC: 106 MEQ/L (ref 95–107)
CO2: 28 MEQ/L (ref 19–31)
CREAT SERPL-MCNC: 0.8 MG/DL (ref 0.6–1.3)
EGFR IF NONAFRICAN AMERICAN: 81 ML/MIN/1.73
EOSINOPHILS RELATIVE PERCENT: 1.4 %
GLUCOSE: 93 MG/DL (ref 70–99)
HCT VFR BLD CALC: 40.3 % (ref 34–45)
HEMOGLOBIN: 13.8 G/DL (ref 11.5–15.5)
LYMPHOCYTE %: 35.7 %
MCH RBC QN AUTO: 30.9 PG (ref 25–33)
MCHC RBC AUTO-ENTMCNC: 34.2 G/DL (ref 31–36)
MCV RBC AUTO: 90.4 FL (ref 80–99)
MONOCYTES # BLD: 8.1 %
NEUTROPHILS RELATIVE PERCENT: 53.6 %
PDW BLD-RTO: 13.1 % (ref 11.5–15)
PLATELETS: 198 K/UL (ref 130–400)
PMV BLD AUTO: 11.6 FL (ref 9.3–13)
POTASSIUM SERPL-SCNC: 3.9 MEQ/L (ref 3.5–5.4)
RBC: 4.46 M/UL (ref 3.8–5.4)
SEDIMENTATION RATE, ERYTHROCYTE: 6 MM/HR (ref 0–20)
SODIUM BLD-SCNC: 142 MEQ/L (ref 133–146)
TOTAL PROTEIN: 6.6 G/DL (ref 6.1–8.3)
WBC: 4.4 K/UL (ref 3.5–11)

## 2023-06-08 DIAGNOSIS — L40.50 PSA (PSORIATIC ARTHRITIS) (HCC): ICD-10-CM

## 2023-06-08 RX ORDER — LEFLUNOMIDE 20 MG/1
20 TABLET ORAL DAILY
Qty: 30 TABLET | Refills: 0 | Status: SHIPPED | OUTPATIENT
Start: 2023-06-08

## 2023-07-24 LAB
ABSOLUTE BASO #: 0.04 K/UL (ref 0–0.2)
ABSOLUTE EOS #: 0.07 K/UL (ref 0–0.5)
ABSOLUTE LYMPH #: 1.56 K/UL (ref 1–4)
ABSOLUTE MONO #: 0.44 K/UL (ref 0.2–1)
ABSOLUTE NEUT #: 2.5 K/UL (ref 1.5–7.5)
ALBUMIN SERPL-MCNC: 4.4 G/DL (ref 3.5–5.2)
ALK PHOSPHATASE: 89 U/L (ref 40–142)
ALT SERPL-CCNC: 15 U/L (ref 5–40)
ANION GAP SERPL CALCULATED.3IONS-SCNC: 12 MEQ/L (ref 7–16)
AST SERPL-CCNC: 22 U/L (ref 9–40)
BASOPHILS RELATIVE PERCENT: 0.9 %
BILIRUB SERPL-MCNC: 0.4 MG/DL
BUN BLDV-MCNC: 8 MG/DL (ref 8–23)
C-REACTIVE PROTEIN: 0.3 MG/DL
CALCIUM SERPL-MCNC: 9.1 MG/DL (ref 8.5–10.5)
CHLORIDE BLD-SCNC: 103 MEQ/L (ref 95–107)
CO2: 27 MEQ/L (ref 19–31)
CREAT SERPL-MCNC: 0.83 MG/DL (ref 0.6–1.3)
EGFR IF NONAFRICAN AMERICAN: 78 ML/MIN/1.73
EOSINOPHILS RELATIVE PERCENT: 1.5 %
GLUCOSE: 82 MG/DL (ref 70–99)
HCT VFR BLD CALC: 40.7 % (ref 34–45)
HEMOGLOBIN: 13.8 G/DL (ref 11.5–15.5)
LYMPHOCYTE %: 33.8 %
MCH RBC QN AUTO: 30.9 PG (ref 25–33)
MCHC RBC AUTO-ENTMCNC: 33.9 G/DL (ref 31–36)
MCV RBC AUTO: 91.3 FL (ref 80–99)
MONOCYTES # BLD: 9.5 %
NEUTROPHILS RELATIVE PERCENT: 54.1 %
PDW BLD-RTO: 13.5 % (ref 11.5–15)
PLATELETS: 211 K/UL (ref 130–400)
PMV BLD AUTO: 11.8 FL (ref 9.3–13)
POTASSIUM SERPL-SCNC: 4 MEQ/L (ref 3.5–5.4)
RBC: 4.46 M/UL (ref 3.8–5.4)
SEDIMENTATION RATE, ERYTHROCYTE: 7 MM/HR (ref 0–20)
SODIUM BLD-SCNC: 142 MEQ/L (ref 133–146)
TOTAL PROTEIN: 6.2 G/DL (ref 6.1–8.3)
WBC: 4.6 K/UL (ref 3.5–11)

## 2023-07-26 ENCOUNTER — OFFICE VISIT (OUTPATIENT)
Dept: RHEUMATOLOGY | Age: 68
End: 2023-07-26
Payer: MEDICAID

## 2023-07-26 VITALS
DIASTOLIC BLOOD PRESSURE: 76 MMHG | WEIGHT: 153.6 LBS | SYSTOLIC BLOOD PRESSURE: 134 MMHG | HEART RATE: 76 BPM | HEIGHT: 67 IN | BODY MASS INDEX: 24.11 KG/M2 | OXYGEN SATURATION: 97 %

## 2023-07-26 DIAGNOSIS — G89.29 CHRONIC MIDLINE LOW BACK PAIN WITHOUT SCIATICA: ICD-10-CM

## 2023-07-26 DIAGNOSIS — L85.3 XEROSIS OF SKIN: ICD-10-CM

## 2023-07-26 DIAGNOSIS — M15.9 OSTEOARTHRITIS OF MULTIPLE JOINTS, UNSPECIFIED OSTEOARTHRITIS TYPE: ICD-10-CM

## 2023-07-26 DIAGNOSIS — L40.9 PSORIASIS: ICD-10-CM

## 2023-07-26 DIAGNOSIS — R19.7 DIARRHEA, UNSPECIFIED TYPE: ICD-10-CM

## 2023-07-26 DIAGNOSIS — M54.50 CHRONIC MIDLINE LOW BACK PAIN WITHOUT SCIATICA: ICD-10-CM

## 2023-07-26 DIAGNOSIS — Z51.81 MEDICATION MONITORING ENCOUNTER: ICD-10-CM

## 2023-07-26 DIAGNOSIS — L40.50 PSA (PSORIATIC ARTHRITIS) (HCC): Primary | ICD-10-CM

## 2023-07-26 PROCEDURE — 1123F ACP DISCUSS/DSCN MKR DOCD: CPT | Performed by: INTERNAL MEDICINE

## 2023-07-26 PROCEDURE — 99214 OFFICE O/P EST MOD 30 MIN: CPT | Performed by: INTERNAL MEDICINE

## 2023-07-26 RX ORDER — ONDANSETRON 2 MG/ML
8 INJECTION INTRAMUSCULAR; INTRAVENOUS
OUTPATIENT
Start: 2023-07-26

## 2023-07-26 RX ORDER — FAMOTIDINE 10 MG/ML
20 INJECTION, SOLUTION INTRAVENOUS
OUTPATIENT
Start: 2023-07-26

## 2023-07-26 RX ORDER — SODIUM CHLORIDE 9 MG/ML
INJECTION, SOLUTION INTRAVENOUS CONTINUOUS
OUTPATIENT
Start: 2023-07-26

## 2023-07-26 RX ORDER — SODIUM CHLORIDE 0.9 % (FLUSH) 0.9 %
5-40 SYRINGE (ML) INJECTION PRN
OUTPATIENT
Start: 2023-07-26

## 2023-07-26 RX ORDER — ACETAMINOPHEN 325 MG/1
650 TABLET ORAL ONCE
OUTPATIENT
Start: 2023-07-26 | End: 2023-07-26

## 2023-07-26 RX ORDER — DIPHENHYDRAMINE HCL 25 MG
25 TABLET ORAL ONCE
OUTPATIENT
Start: 2023-07-26 | End: 2023-07-26

## 2023-07-26 RX ORDER — SODIUM CHLORIDE 9 MG/ML
5-250 INJECTION, SOLUTION INTRAVENOUS PRN
OUTPATIENT
Start: 2023-07-26

## 2023-07-26 RX ORDER — HEPARIN SODIUM (PORCINE) LOCK FLUSH IV SOLN 100 UNIT/ML 100 UNIT/ML
500 SOLUTION INTRAVENOUS PRN
OUTPATIENT
Start: 2023-07-26

## 2023-07-26 RX ORDER — ALBUTEROL SULFATE 90 UG/1
4 AEROSOL, METERED RESPIRATORY (INHALATION) PRN
OUTPATIENT
Start: 2023-07-26

## 2023-07-26 RX ORDER — ACETAMINOPHEN 325 MG/1
650 TABLET ORAL
OUTPATIENT
Start: 2023-07-26

## 2023-07-26 RX ORDER — DIPHENHYDRAMINE HYDROCHLORIDE 50 MG/ML
50 INJECTION INTRAMUSCULAR; INTRAVENOUS
OUTPATIENT
Start: 2023-07-26

## 2023-07-26 RX ORDER — HYDROXYZINE HYDROCHLORIDE 25 MG/1
25 TABLET, FILM COATED ORAL EVERY 8 HOURS PRN
Qty: 30 TABLET | Refills: 0 | Status: SHIPPED | OUTPATIENT
Start: 2023-07-26 | End: 2023-08-05

## 2023-07-26 RX ORDER — EPINEPHRINE 1 MG/ML
0.3 INJECTION, SOLUTION, CONCENTRATE INTRAVENOUS PRN
OUTPATIENT
Start: 2023-07-26

## 2023-07-26 ASSESSMENT — ENCOUNTER SYMPTOMS
BACK PAIN: 1
TROUBLE SWALLOWING: 0
ABDOMINAL PAIN: 1
NAUSEA: 0
EYE ITCHING: 0
CONSTIPATION: 0
EYE PAIN: 0
DIARRHEA: 1
COUGH: 0
SHORTNESS OF BREATH: 0

## 2023-07-26 NOTE — PROGRESS NOTES
[facility] RHEUMATOLOGY FOLLOW UP NOTE       Date Of Service: 7/26/2023  Provider: Paty Kaba DO    Name: Paulina Ramirez   MRN: [id number]    Chief Complaint(s)     Chief Complaint   Patient presents with    Follow-up     3 month follow up        History of Present Illness (HPI)     Paulina Ramirez  is a(n)68 y.o. female with a hx of anemia, chronic gastritis, reflux esophagitis, h/o diverticulitis compression fracture, depression, h/o ulcerative colitis and colon polyps, psoriasis, here for the f/u evaluation of psoriasis arthritis, psoriasis     Interval hx:   ---worsened psoriasis  in the scalp and neck , knees- \"worse it ever has been\". -- arthralgia left hips ad Right 3rd pip - pain around 4.5/10 over the past week w/ constant pain in the lower back and intermittent in the hands. Aggravating factors: lower ext/back: prolonged standing or sitting Alleviating factors: tylenol  back w/ movement  Most stiffness lasting 30-45 minutes. Denies joint swelling       Ulcertive colitis - daily  2-3loose bm's per day , worsened w/ certain foods. [facility] blood ,  mucous in the stools. Occasional urgency     REVIEW OF SYSTEMS: (ROS)    Review of Systems   Constitutional:  Negative for fatigue, fever and unexpected weight change. HENT:  Negative for congestion and trouble swallowing. Hair loss   Eyes:  Negative for pain and itching. Dry eyes   Respiratory:  Negative for cough and shortness of breath. Cardiovascular:  Negative for chest pain and leg swelling. Gastrointestinal:  Positive for abdominal pain and diarrhea. Negative for constipation and nausea. Endocrine: Negative for cold intolerance and heat intolerance. Genitourinary:  Negative for difficulty urinating, frequency and urgency. Musculoskeletal:  Positive for arthralgias, back pain and neck pain. Negative for joint swelling. Skin:  Positive for rash. Neurological:  Positive for headaches.  Negative for dizziness, weakness and

## 2023-07-31 LAB — CALPROTECTIN, FECAL: 27 MCG/G

## 2023-08-01 LAB
QUANTI TB1 MINUS NIL: 0.01 IU/ML
QUANTI TB2 MINUS NIL: 0 IU/ML
QUANTIFERON MITOGEN MINUS NIL: 8.63 IU/ML
QUANTIFERON NIL: 0.03 IU/ML
QUANTIFERON TB GOLD PLUS: NEGATIVE

## 2023-08-04 ENCOUNTER — TELEPHONE (OUTPATIENT)
Dept: RHEUMATOLOGY | Age: 68
End: 2023-08-04

## 2023-08-04 NOTE — TELEPHONE ENCOUNTER
----- Message from Madhu Ramirez DO sent at 8/1/2023  5:04 PM EDT -----  The stool study to evaluate for inflammation within the large intestines was negative. The test to help evaluate for prior tuberculosis exposure was negative as well.

## 2023-08-17 ENCOUNTER — TELEPHONE (OUTPATIENT)
Dept: RHEUMATOLOGY | Age: 68
End: 2023-08-17

## 2023-08-17 NOTE — TELEPHONE ENCOUNTER
Auth team states the following. Health plan requires patient to try and fail with either [employer] or [employer]lect[employer] before they can approve Renflexis. Please advise if you would like to switch to a preferred product and what product you would like the patient switched to.

## 2023-10-13 RX ORDER — PANTOPRAZOLE SODIUM 40 MG/1
TABLET, DELAYED RELEASE ORAL
Qty: 180 TABLET | Refills: 0 | Status: SHIPPED | OUTPATIENT
Start: 2023-10-13

## 2023-10-13 NOTE — TELEPHONE ENCOUNTER
The pharmacy is  requesting a refill of the below medication which has been pended for you:     Requested Prescriptions     Pending Prescriptions Disp Refills    pantoprazole (PROTONIX) 40 MG tablet [Pharmacy Med Name: Pantoprazole Sodium 40 MG Oral Tablet Delayed Release] 180 tablet 1     Sig: Take 1 tablet by mouth twice daily       Last Appointment Date: 4/27/2023  Next Appointment Date: 10/31/2023    Allergies   Allergen Reactions    Codeine Nausea And Vomiting and Other (See Comments)     headache    Penicillins Rash

## 2024-01-09 ENCOUNTER — OFFICE VISIT (OUTPATIENT)
Dept: FAMILY MEDICINE CLINIC | Age: 69
End: 2024-01-09

## 2024-01-09 VITALS
SYSTOLIC BLOOD PRESSURE: 122 MMHG | RESPIRATION RATE: 16 BRPM | DIASTOLIC BLOOD PRESSURE: 68 MMHG | WEIGHT: 162 LBS | HEART RATE: 80 BPM | BODY MASS INDEX: 25.43 KG/M2 | HEIGHT: 67 IN

## 2024-01-09 DIAGNOSIS — L40.50 PSA (PSORIATIC ARTHRITIS) (HCC): Primary | ICD-10-CM

## 2024-01-09 DIAGNOSIS — L40.9 PSORIASIS: ICD-10-CM

## 2024-01-09 DIAGNOSIS — K21.00 CHRONIC REFLUX ESOPHAGITIS: ICD-10-CM

## 2024-01-09 PROCEDURE — 1123F ACP DISCUSS/DSCN MKR DOCD: CPT | Performed by: EMERGENCY MEDICINE

## 2024-01-09 PROCEDURE — 99213 OFFICE O/P EST LOW 20 MIN: CPT | Performed by: EMERGENCY MEDICINE

## 2024-01-09 RX ORDER — PANTOPRAZOLE SODIUM 40 MG/1
40 TABLET, DELAYED RELEASE ORAL 2 TIMES DAILY
Qty: 180 TABLET | Refills: 1 | Status: SHIPPED | OUTPATIENT
Start: 2024-01-09

## 2024-01-09 RX ORDER — CLOBETASOL PROPIONATE 0.05 G/100ML
SHAMPOO TOPICAL
Qty: 118 ML | Refills: 2 | Status: SHIPPED | OUTPATIENT
Start: 2024-01-09

## 2024-01-09 ASSESSMENT — ENCOUNTER SYMPTOMS
COUGH: 0
RHINORRHEA: 0
BACK PAIN: 0
VOICE CHANGE: 0
SINUS PRESSURE: 0
CHEST TIGHTNESS: 0
VOMITING: 0
WHEEZING: 0
CONSTIPATION: 0
ABDOMINAL PAIN: 0
TROUBLE SWALLOWING: 0
DIARRHEA: 0
SORE THROAT: 0
NAUSEA: 0
SHORTNESS OF BREATH: 0

## 2024-01-09 ASSESSMENT — PATIENT HEALTH QUESTIONNAIRE - PHQ9
3. TROUBLE FALLING OR STAYING ASLEEP: 0
9. THOUGHTS THAT YOU WOULD BE BETTER OFF DEAD, OR OF HURTING YOURSELF: 0
SUM OF ALL RESPONSES TO PHQ QUESTIONS 1-9: 0
5. POOR APPETITE OR OVEREATING: 0
4. FEELING TIRED OR HAVING LITTLE ENERGY: 0
8. MOVING OR SPEAKING SO SLOWLY THAT OTHER PEOPLE COULD HAVE NOTICED. OR THE OPPOSITE, BEING SO FIGETY OR RESTLESS THAT YOU HAVE BEEN MOVING AROUND A LOT MORE THAN USUAL: 0
6. FEELING BAD ABOUT YOURSELF - OR THAT YOU ARE A FAILURE OR HAVE LET YOURSELF OR YOUR FAMILY DOWN: 0
SUM OF ALL RESPONSES TO PHQ9 QUESTIONS 1 & 2: 0
1. LITTLE INTEREST OR PLEASURE IN DOING THINGS: 0
2. FEELING DOWN, DEPRESSED OR HOPELESS: 0
7. TROUBLE CONCENTRATING ON THINGS, SUCH AS READING THE NEWSPAPER OR WATCHING TELEVISION: 0
SUM OF ALL RESPONSES TO PHQ QUESTIONS 1-9: 0
10. IF YOU CHECKED OFF ANY PROBLEMS, HOW DIFFICULT HAVE THESE PROBLEMS MADE IT FOR YOU TO DO YOUR WORK, TAKE CARE OF THINGS AT HOME, OR GET ALONG WITH OTHER PEOPLE: 0

## 2024-01-09 NOTE — PROGRESS NOTES
Date: 1/9/2024    :    Kathy Morse is a 68 y.o.female who presents today for:  Chief Complaint   Patient presents with    Gastroesophageal Reflux         HPI:       Gastroesophageal Reflux  She reports no abdominal pain, no chest pain, no coughing, no nausea, no sore throat or no wheezing. Pertinent negatives include no fatigue.       Not seeing Dr Granger as she did not have any difference specially with her plaque psoriasi on the scalp    She in not working at present as her work closed    CurrentHome Medications were reviewed and updated today by this Provider  Current Outpatient Medications   Medication Sig Dispense Refill    Multiple Vitamins-Minerals (HAIR/SKIN/NAILS) TABS Take 3 tablets by mouth daily      Clobetasol Propionate 0.05 % SHAM Apply nightly for active scalp psoriasis 118 mL 2    pantoprazole (PROTONIX) 40 MG tablet Take 1 tablet by mouth 2 times daily 180 tablet 1    Biotin 64745 MCG TABS Take 1 tablet by mouth daily       No current facility-administered medications for this visit.       Subjective:      Review of Systems   Constitutional:  Negative for appetite change, chills, diaphoresis, fatigue and fever.   HENT:  Negative for congestion, ear pain, postnasal drip, rhinorrhea, sinus pressure, sneezing, sore throat, trouble swallowing and voice change.    Respiratory:  Negative for cough, chest tightness, shortness of breath and wheezing.    Cardiovascular:  Negative for chest pain, palpitations and leg swelling.   Gastrointestinal:  Negative for abdominal pain, constipation, diarrhea, nausea and vomiting.   Musculoskeletal:  Negative for arthralgias, back pain, joint swelling, myalgias, neck pain and neck stiffness.   Neurological:  Negative for dizziness, syncope, weakness, light-headedness, numbness and headaches.       Objective:     /68 (Site: Right Upper Arm, Position: Sitting, Cuff Size: Medium Adult)   Pulse 80   Resp 16   Ht 1.702 m (5' 7\")   Wt 73.5 kg (162 lb)   BMI

## 2024-05-13 ENCOUNTER — TELEPHONE (OUTPATIENT)
Dept: FAMILY MEDICINE CLINIC | Age: 69
End: 2024-05-13

## 2024-05-13 NOTE — TELEPHONE ENCOUNTER
MOM for pt to CB. Pt is due for a mammogram and would like to know if she would like for me to schedule one. If so which hospital and a certain day or time?

## 2024-08-01 ENCOUNTER — OFFICE VISIT (OUTPATIENT)
Dept: FAMILY MEDICINE CLINIC | Age: 69
End: 2024-08-01

## 2024-08-01 VITALS
HEART RATE: 84 BPM | SYSTOLIC BLOOD PRESSURE: 138 MMHG | DIASTOLIC BLOOD PRESSURE: 76 MMHG | WEIGHT: 151.8 LBS | BODY MASS INDEX: 23.83 KG/M2 | HEIGHT: 67 IN | RESPIRATION RATE: 12 BRPM

## 2024-08-01 DIAGNOSIS — K29.50 CHRONIC GASTRITIS WITHOUT BLEEDING, UNSPECIFIED GASTRITIS TYPE: ICD-10-CM

## 2024-08-01 DIAGNOSIS — L40.50 PSA (PSORIATIC ARTHRITIS) (HCC): ICD-10-CM

## 2024-08-01 DIAGNOSIS — Z00.00 WELLNESS EXAMINATION: Primary | ICD-10-CM

## 2024-08-01 DIAGNOSIS — L40.9 PSORIASIS: ICD-10-CM

## 2024-08-01 DIAGNOSIS — Z78.0 POSTMENOPAUSAL: ICD-10-CM

## 2024-08-01 DIAGNOSIS — Z12.31 ENCOUNTER FOR SCREENING MAMMOGRAM FOR MALIGNANT NEOPLASM OF BREAST: ICD-10-CM

## 2024-08-01 RX ORDER — MULTIVIT-MIN/IRON/FOLIC ACID/K 18-600-40
500 CAPSULE ORAL DAILY
COMMUNITY

## 2024-08-01 RX ORDER — TRIAMCINOLONE ACETONIDE 40 MG/ML
80 INJECTION, SUSPENSION INTRA-ARTICULAR; INTRAMUSCULAR ONCE
Status: COMPLETED | OUTPATIENT
Start: 2024-08-01 | End: 2024-08-01

## 2024-08-01 RX ORDER — CLOBETASOL PROPIONATE 0.05 G/100ML
SHAMPOO TOPICAL
Qty: 118 ML | Refills: 2 | Status: SHIPPED | OUTPATIENT
Start: 2024-08-01

## 2024-08-01 RX ORDER — PANTOPRAZOLE SODIUM 40 MG/1
40 TABLET, DELAYED RELEASE ORAL 2 TIMES DAILY
Qty: 180 TABLET | Refills: 1 | Status: SHIPPED | OUTPATIENT
Start: 2024-08-01

## 2024-08-01 RX ORDER — PREDNISONE 10 MG/1
TABLET ORAL
Qty: 16 TABLET | Refills: 0 | Status: SHIPPED | OUTPATIENT
Start: 2024-08-01

## 2024-08-01 RX ADMIN — TRIAMCINOLONE ACETONIDE 80 MG: 40 INJECTION, SUSPENSION INTRA-ARTICULAR; INTRAMUSCULAR at 11:36

## 2024-08-01 SDOH — ECONOMIC STABILITY: FOOD INSECURITY: WITHIN THE PAST 12 MONTHS, YOU WORRIED THAT YOUR FOOD WOULD RUN OUT BEFORE YOU GOT MONEY TO BUY MORE.: NEVER TRUE

## 2024-08-01 SDOH — ECONOMIC STABILITY: INCOME INSECURITY: HOW HARD IS IT FOR YOU TO PAY FOR THE VERY BASICS LIKE FOOD, HOUSING, MEDICAL CARE, AND HEATING?: NOT HARD AT ALL

## 2024-08-01 SDOH — ECONOMIC STABILITY: FOOD INSECURITY: WITHIN THE PAST 12 MONTHS, THE FOOD YOU BOUGHT JUST DIDN'T LAST AND YOU DIDN'T HAVE MONEY TO GET MORE.: NEVER TRUE

## 2024-08-01 ASSESSMENT — ENCOUNTER SYMPTOMS
VOMITING: 0
TROUBLE SWALLOWING: 0
COUGH: 0
RHINORRHEA: 0
CONSTIPATION: 0
NAUSEA: 0
SHORTNESS OF BREATH: 0
ABDOMINAL PAIN: 0
VOICE CHANGE: 0
WHEEZING: 0
CHEST TIGHTNESS: 0
SORE THROAT: 0
SINUS PRESSURE: 0
DIARRHEA: 0

## 2024-08-01 NOTE — PROGRESS NOTES
Date: 8/1/2024    :    Kathy Morse is a 69 y.o.female who presents today for:  Chief Complaint   Patient presents with    6 Month Follow-Up    Depression    Arthritis         HPI:     Fatigue and joint pain for 2 weeks. Hx Psoriatic arthritis but stop taking any meds and not seen Dr Delgado rheumatology x 1.5 years.  Patient has been on Enbrel Humira,, the last time was Tremfya and she did well with it until now    Feeling a lot of joint pains even at night with make her not to have a good sleep.  No fever no chills no flu no shortness of breath or chest pain no urinary symptoms      CurrentHome Medications were reviewed and updated today by this Provider  Current Outpatient Medications   Medication Sig Dispense Refill    Ascorbic Acid (VITAMIN C) 500 MG CAPS Take 500 mg by mouth daily      Ferrous Gluconate (IRON 27 PO) Take 27 mg by mouth daily      pantoprazole (PROTONIX) 40 MG tablet Take 1 tablet by mouth 2 times daily 180 tablet 1    Clobetasol Propionate 0.05 % SHAM Apply nightly for active scalp psoriasis 118 mL 2    predniSONE (DELTASONE) 10 MG tablet 2 tablets 2 times a day for 2 days then 1  Tablet 2 times a day for 2 days, then 1 tablet daily till gone 16 tablet 0    Biotin 68554 MCG TABS Take 1 tablet by mouth daily       No current facility-administered medications for this visit.       Subjective:      Review of Systems   Constitutional:  Negative for appetite change, chills, diaphoresis, fatigue and fever.   HENT:  Negative for congestion, ear pain, postnasal drip, rhinorrhea, sinus pressure, sneezing, sore throat, trouble swallowing and voice change.    Respiratory:  Negative for cough, chest tightness, shortness of breath and wheezing.    Cardiovascular:  Negative for chest pain, palpitations and leg swelling.   Gastrointestinal:  Negative for abdominal pain, constipation, diarrhea, nausea and vomiting.   Musculoskeletal:  Positive for arthralgias, arthritis, joint swelling and myalgias. Negative

## 2024-08-01 NOTE — PROGRESS NOTES
After obtaining consent, and per orders of Dr. Sanford, injection of Kenalog 80 mg given in Right upper quad. gluteus by Sylvia Contreras MA. Patient instructed to remain in clinic for 20 minutes afterwards, and to report any adverse reaction to me immediately.

## 2024-08-02 LAB
ALBUMIN: 4.6 G/DL (ref 3.5–5.2)
ALK PHOSPHATASE: 92 U/L (ref 40–142)
ALT SERPL-CCNC: 10 U/L (ref 5–40)
ANION GAP SERPL CALCULATED.3IONS-SCNC: 9 MEQ/L (ref 7–16)
AST SERPL-CCNC: 19 U/L (ref 9–40)
BASOPHILS ABSOLUTE: 0.01 K/UL (ref 0–0.2)
BASOPHILS RELATIVE PERCENT: 0.1 %
BILIRUB SERPL-MCNC: 0.5 MG/DL
BUN BLDV-MCNC: 14 MG/DL (ref 8–23)
C-REACTIVE PROTEIN: 0.7 MG/DL
CALCIUM SERPL-MCNC: 9.9 MG/DL (ref 8.5–10.5)
CHLORIDE BLD-SCNC: 105 MEQ/L (ref 95–107)
CHOLESTEROL, TOTAL: 244 MG/DL
CHOLESTEROL/HDL RATIO: 4.6 RATIO
CO2: 27 MEQ/L (ref 19–31)
CREAT SERPL-MCNC: 0.85 MG/DL (ref 0.6–1.3)
EGFR IF NONAFRICAN AMERICAN: 75 ML/MIN/1.73
EOSINOPHILS ABSOLUTE: 0 K/UL (ref 0–0.5)
EOSINOPHILS RELATIVE PERCENT: 0 %
GLUCOSE: 111 MG/DL (ref 70–99)
HCT VFR BLD CALC: 43.7 % (ref 34–45)
HDLC SERPL-MCNC: 53 MG/DL
HEMOGLOBIN: 14.2 G/DL (ref 11.5–15.5)
IMMATURE GRANS (ABS): 0.06
IMMATURE GRANULOCYTES %: 0.7 %
LDL CHOLESTEROL: 172 MG/DL
LDL/HDL RATIO: 3.2 RATIO
LYMPHOCYTES ABSOLUTE: 1.26 K/UL (ref 1–4)
LYMPHOCYTES RELATIVE PERCENT: 14.5 %
MCH RBC QN AUTO: 30.6 PG (ref 25–33)
MCHC RBC AUTO-ENTMCNC: 32.5 G/DL (ref 31–36)
MCV RBC AUTO: 94.2 FL (ref 80–99)
MONOCYTES ABSOLUTE: 0.16 K/UL (ref 0.2–1)
MONOCYTES RELATIVE PERCENT: 1.8 %
NEUTROPHILS ABSOLUTE: 7.21 K/UL (ref 1.5–7.5)
NEUTROPHILS RELATIVE PERCENT: 82.9 %
PDW BLD-RTO: 12.8 % (ref 11.5–15)
PLATELET # BLD: 269 K/UL (ref 130–400)
PMV BLD AUTO: 11.5 FL (ref 9.3–13)
POTASSIUM SERPL-SCNC: 4.9 MEQ/L (ref 3.5–5.4)
RBC # BLD: 4.64 M/UL (ref 3.8–5.4)
SED RATE, AUTOMATED: 14 MM/HR (ref 0–20)
SODIUM BLD-SCNC: 141 MEQ/L (ref 133–146)
TOTAL PROTEIN: 7.3 G/DL (ref 6.1–8.3)
TRIGL SERPL-MCNC: 94 MG/DL
VLDLC SERPL CALC-MCNC: 19 MG/DL
WBC # BLD: 8.7 K/UL (ref 3.5–11)

## 2024-08-05 ENCOUNTER — TELEPHONE (OUTPATIENT)
Dept: FAMILY MEDICINE CLINIC | Age: 69
End: 2024-08-05

## 2024-08-05 NOTE — TELEPHONE ENCOUNTER
----- Message from Jelani Sanford MD sent at 8/5/2024 11:36 AM EDT -----  Labs look good except for her cholesterol

## 2024-11-05 ENCOUNTER — OFFICE VISIT (OUTPATIENT)
Dept: FAMILY MEDICINE CLINIC | Age: 69
End: 2024-11-05

## 2024-11-05 VITALS
HEART RATE: 68 BPM | RESPIRATION RATE: 12 BRPM | DIASTOLIC BLOOD PRESSURE: 68 MMHG | BODY MASS INDEX: 25.24 KG/M2 | WEIGHT: 160.8 LBS | HEIGHT: 67 IN | SYSTOLIC BLOOD PRESSURE: 124 MMHG

## 2024-11-05 DIAGNOSIS — E78.00 HYPERCHOLESTEROLEMIA: ICD-10-CM

## 2024-11-05 DIAGNOSIS — L40.9 PSORIASIS: ICD-10-CM

## 2024-11-05 DIAGNOSIS — Z12.11 ENCOUNTER FOR SCREENING COLONOSCOPY: ICD-10-CM

## 2024-11-05 DIAGNOSIS — K21.00 CHRONIC REFLUX ESOPHAGITIS: ICD-10-CM

## 2024-11-05 DIAGNOSIS — K29.50 CHRONIC GASTRITIS WITHOUT BLEEDING, UNSPECIFIED GASTRITIS TYPE: Primary | ICD-10-CM

## 2024-11-05 PROCEDURE — 99214 OFFICE O/P EST MOD 30 MIN: CPT | Performed by: EMERGENCY MEDICINE

## 2024-11-05 PROCEDURE — 1123F ACP DISCUSS/DSCN MKR DOCD: CPT | Performed by: EMERGENCY MEDICINE

## 2024-11-05 RX ORDER — DEXLANSOPRAZOLE 60 MG/1
60 CAPSULE, DELAYED RELEASE ORAL DAILY
Qty: 90 CAPSULE | Refills: 1 | Status: SHIPPED | OUTPATIENT
Start: 2024-11-05

## 2024-11-05 RX ORDER — CLOBETASOL PROPIONATE 0.05 G/100ML
SHAMPOO TOPICAL
Qty: 118 ML | Refills: 2 | Status: SHIPPED | OUTPATIENT
Start: 2024-11-05

## 2024-11-05 RX ORDER — SUCRALFATE 1 G/1
1 TABLET ORAL
Qty: 120 TABLET | Refills: 3 | Status: SHIPPED | OUTPATIENT
Start: 2024-11-05

## 2024-11-05 NOTE — PROGRESS NOTES
palpitations and leg swelling.   Gastrointestinal:  Negative for abdominal pain, constipation, diarrhea, nausea and vomiting.        + GERD   Musculoskeletal:  Negative for arthralgias, back pain, joint swelling, myalgias, neck pain and neck stiffness.   Neurological:  Negative for dizziness, syncope, weakness, light-headedness, numbness and headaches.   Psychiatric/Behavioral:  Positive for depression.        Objective:     /68 (Site: Right Upper Arm, Position: Sitting, Cuff Size: Medium Adult)   Pulse 68   Resp 12   Ht 1.702 m (5' 7\")   Wt 72.9 kg (160 lb 12.8 oz)   BMI 25.18 kg/m²   BP Readings from Last 3 Encounters:   11/05/24 124/68   08/01/24 138/76   01/09/24 122/68     Wt Readings from Last 3 Encounters:   11/05/24 72.9 kg (160 lb 12.8 oz)   08/01/24 68.9 kg (151 lb 12.8 oz)   01/09/24 73.5 kg (162 lb)       Physical Exam  Vitals reviewed.   Constitutional:       Appearance: She is well-developed.   HENT:      Head: Normocephalic and atraumatic.      Right Ear: External ear normal.      Left Ear: External ear normal.      Nose: Nose normal.   Eyes:      General: No scleral icterus.     Conjunctiva/sclera: Conjunctivae normal.      Pupils: Pupils are equal, round, and reactive to light.   Neck:      Thyroid: No thyromegaly.      Vascular: No JVD.   Cardiovascular:      Rate and Rhythm: Normal rate and regular rhythm.      Heart sounds: No murmur heard.     No friction rub.   Pulmonary:      Effort: Pulmonary effort is normal.      Breath sounds: Normal breath sounds. No wheezing or rales.   Chest:      Chest wall: No tenderness.   Abdominal:      General: Bowel sounds are normal.      Palpations: Abdomen is soft. There is no mass.      Tenderness: There is no abdominal tenderness.   Musculoskeletal:      Cervical back: Normal range of motion and neck supple.   Lymphadenopathy:      Cervical: No cervical adenopathy.   Skin:     Findings: No rash.   Neurological:      Mental Status: She is alert and

## 2024-11-21 ENCOUNTER — TELEPHONE (OUTPATIENT)
Dept: FAMILY MEDICINE CLINIC | Age: 69
End: 2024-11-21

## 2024-11-21 NOTE — TELEPHONE ENCOUNTER
Patient called stating she's had diarrhea, nausea and abd pain since starting the Dexilant.      Takes it in the am and has diarrhea about 10 times until noon.        Req something different.    Uses [facility] in [place].    Please call him back.

## 2024-12-02 ENCOUNTER — TELEPHONE (OUTPATIENT)
Dept: FAMILY MEDICINE CLINIC | Age: 69
End: 2024-12-02

## 2024-12-02 NOTE — TELEPHONE ENCOUNTER
We received a letter via fax from "[employer] stating that they have attempted to contact patient with no success. Letter scanned into Epic.    Pt is to call [phone] to schedule with them.    MOM for patient to called office.

## 2024-12-04 NOTE — TELEPHONE ENCOUNTER
Pt stated that she has not been feeling good and will call them to schedule when she starts feeling better.

## 2025-03-04 ENCOUNTER — TELEPHONE (OUTPATIENT)
Dept: FAMILY MEDICINE CLINIC | Age: 70
End: 2025-03-04

## 2025-03-04 LAB
ALBUMIN: 4.6 G/DL (ref 3.5–5.2)
ALK PHOSPHATASE: 84 U/L (ref 30–146)
ALT SERPL-CCNC: 14 U/L (ref 5–33)
ANION GAP SERPL CALCULATED.3IONS-SCNC: 11 MMOL/L (ref 7–16)
AST SERPL-CCNC: 18 U/L (ref 9–40)
BILIRUB SERPL-MCNC: 0.6 MG/DL
BUN BLDV-MCNC: 13 MG/DL (ref 8–23)
CALCIUM SERPL-MCNC: 9 MG/DL (ref 8.6–10.5)
CHLORIDE BLD-SCNC: 104 MMOL/L (ref 96–107)
CHOLESTEROL, TOTAL: 224 MG/DL (ref 100–199)
CHOLESTEROL/HDL RATIO: 5.2 (ref 2–4.5)
CO2: 27 MMOL/L (ref 18–32)
CREAT SERPL-MCNC: 0.99 MG/DL (ref 0.51–1.15)
EGFR IF NONAFRICAN AMERICAN: 62 ML/MIN/1.73M2
GLUCOSE: 86 MG/DL (ref 70–100)
HDLC SERPL-MCNC: 43 MG/DL
LDL CHOLESTEROL: 152 MG/DL
LDL/HDL RATIO: 3.5
POTASSIUM SERPL-SCNC: 4.2 MMOL/L (ref 3.5–5.4)
SODIUM BLD-SCNC: 142 MMOL/L (ref 135–148)
TOTAL PROTEIN: 6.6 G/DL (ref 6–8.3)
TRIGL SERPL-MCNC: 147 MG/DL (ref 20–149)
VLDLC SERPL CALC-MCNC: 29 MG/DL

## 2025-03-04 NOTE — RESULT ENCOUNTER NOTE
Recent laboratory showed total cholesterol still high at 224 LDL of 152 needs to be on cholesterol pills.  Will prescribe Lipitor if the patient agrees please call patient

## 2025-03-04 NOTE — TELEPHONE ENCOUNTER
----- Message from Dr. Jelani Sanford MD sent at 3/4/2025  4:13 PM EST -----  Recent laboratory showed total cholesterol still high at 224 LDL of 152 needs to be on cholesterol pills.  Will prescribe Lipitor if the patient agrees please call patient

## 2025-03-06 ENCOUNTER — OFFICE VISIT (OUTPATIENT)
Dept: FAMILY MEDICINE CLINIC | Age: 70
End: 2025-03-06

## 2025-03-06 VITALS
HEART RATE: 76 BPM | DIASTOLIC BLOOD PRESSURE: 72 MMHG | SYSTOLIC BLOOD PRESSURE: 144 MMHG | BODY MASS INDEX: 26.86 KG/M2 | HEIGHT: 67 IN | WEIGHT: 171.13 LBS | RESPIRATION RATE: 18 BRPM

## 2025-03-06 DIAGNOSIS — I10 PRIMARY HYPERTENSION: ICD-10-CM

## 2025-03-06 DIAGNOSIS — E78.00 HYPERCHOLESTEROLEMIA: ICD-10-CM

## 2025-03-06 DIAGNOSIS — L40.9 PSORIASIS: ICD-10-CM

## 2025-03-06 DIAGNOSIS — Z00.00 INITIAL MEDICARE ANNUAL WELLNESS VISIT: Primary | ICD-10-CM

## 2025-03-06 DIAGNOSIS — K21.00 CHRONIC REFLUX ESOPHAGITIS: ICD-10-CM

## 2025-03-06 PROBLEM — L40.50 PSA (PSORIATIC ARTHRITIS) (HCC): Status: RESOLVED | Noted: 2023-07-26 | Resolved: 2025-03-06

## 2025-03-06 PROBLEM — M05.9 RHEUMATOID ARTHRITIS WITH RHEUMATOID FACTOR, UNSPECIFIED (HCC): Status: RESOLVED | Noted: 2023-04-27 | Resolved: 2025-03-06

## 2025-03-06 PROBLEM — M06.9 RHEUMATOID ARTHRITIS, UNSPECIFIED (HCC): Status: RESOLVED | Noted: 2023-04-27 | Resolved: 2025-03-06

## 2025-03-06 RX ORDER — PANTOPRAZOLE SODIUM 40 MG/1
40 TABLET, DELAYED RELEASE ORAL 2 TIMES DAILY
Qty: 180 TABLET | Refills: 1 | Status: SHIPPED | OUTPATIENT
Start: 2025-03-06

## 2025-03-06 RX ORDER — ATORVASTATIN CALCIUM 20 MG/1
20 TABLET, FILM COATED ORAL DAILY
Qty: 30 TABLET | Refills: 3 | Status: SHIPPED | OUTPATIENT
Start: 2025-03-06

## 2025-03-06 RX ORDER — SUCRALFATE 1 G/1
1 TABLET ORAL
Qty: 120 TABLET | Refills: 3 | Status: SHIPPED | OUTPATIENT
Start: 2025-03-06

## 2025-03-06 RX ORDER — MULTIVIT-MIN/IRON/FOLIC ACID/K 18-600-40
2000 CAPSULE ORAL DAILY
COMMUNITY

## 2025-03-06 RX ORDER — CLOBETASOL PROPIONATE 0.05 G/100ML
SHAMPOO TOPICAL
Qty: 118 ML | Refills: 2 | Status: SHIPPED | OUTPATIENT
Start: 2025-03-06

## 2025-03-06 SDOH — ECONOMIC STABILITY: FOOD INSECURITY: WITHIN THE PAST 12 MONTHS, THE FOOD YOU BOUGHT JUST DIDN'T LAST AND YOU DIDN'T HAVE MONEY TO GET MORE.: NEVER TRUE

## 2025-03-06 SDOH — ECONOMIC STABILITY: FOOD INSECURITY: WITHIN THE PAST 12 MONTHS, YOU WORRIED THAT YOUR FOOD WOULD RUN OUT BEFORE YOU GOT MONEY TO BUY MORE.: NEVER TRUE

## 2025-03-06 ASSESSMENT — ENCOUNTER SYMPTOMS
COUGH: 0
TROUBLE SWALLOWING: 0
VOICE CHANGE: 0
DIARRHEA: 0
WHEEZING: 0
VOMITING: 0
BACK PAIN: 0
RHINORRHEA: 0
SINUS PRESSURE: 0
ABDOMINAL PAIN: 0
SORE THROAT: 0
SHORTNESS OF BREATH: 0
NAUSEA: 0
CHEST TIGHTNESS: 0
CONSTIPATION: 0

## 2025-03-06 ASSESSMENT — PATIENT HEALTH QUESTIONNAIRE - PHQ9
3. TROUBLE FALLING OR STAYING ASLEEP: SEVERAL DAYS
SUM OF ALL RESPONSES TO PHQ QUESTIONS 1-9: 1
5. POOR APPETITE OR OVEREATING: NOT AT ALL
7. TROUBLE CONCENTRATING ON THINGS, SUCH AS READING THE NEWSPAPER OR WATCHING TELEVISION: NOT AT ALL
SUM OF ALL RESPONSES TO PHQ QUESTIONS 1-9: 1
2. FEELING DOWN, DEPRESSED OR HOPELESS: NOT AT ALL
4. FEELING TIRED OR HAVING LITTLE ENERGY: NOT AT ALL
SUM OF ALL RESPONSES TO PHQ QUESTIONS 1-9: 1
1. LITTLE INTEREST OR PLEASURE IN DOING THINGS: NOT AT ALL
6. FEELING BAD ABOUT YOURSELF - OR THAT YOU ARE A FAILURE OR HAVE LET YOURSELF OR YOUR FAMILY DOWN: NOT AT ALL
10. IF YOU CHECKED OFF ANY PROBLEMS, HOW DIFFICULT HAVE THESE PROBLEMS MADE IT FOR YOU TO DO YOUR WORK, TAKE CARE OF THINGS AT HOME, OR GET ALONG WITH OTHER PEOPLE: NOT DIFFICULT AT ALL
SUM OF ALL RESPONSES TO PHQ QUESTIONS 1-9: 1
8. MOVING OR SPEAKING SO SLOWLY THAT OTHER PEOPLE COULD HAVE NOTICED. OR THE OPPOSITE, BEING SO FIGETY OR RESTLESS THAT YOU HAVE BEEN MOVING AROUND A LOT MORE THAN USUAL: NOT AT ALL

## 2025-03-06 ASSESSMENT — LIFESTYLE VARIABLES
HOW MANY STANDARD DRINKS CONTAINING ALCOHOL DO YOU HAVE ON A TYPICAL DAY: 1 OR 2
HOW OFTEN DO YOU HAVE A DRINK CONTAINING ALCOHOL: MONTHLY OR LESS

## 2025-03-06 NOTE — PROGRESS NOTES
Medicare Annual Wellness Visit    Kathy Morse is here for Medicare AWV      Assessment & Plan   Initial Medicare annual wellness visit  Psoriasis  -     Clobetasol Propionate 0.05 % SHAM; Apply nightly for active scalp psoriasis, Disp-118 mL, R-2Normal  Primary hypertension  Chronic reflux esophagitis  Hypercholesterolemia  -     Lipid Panel; Future  -     Hepatic Function Panel; Future       Return in about 3 months (around 6/6/2025).     Subjective   The following acute and/or chronic problems were also addressed today:  Elevated blood pressure likely primary hypertension  Hyperlipidemia  Chronic reflux esophagitis and gastritis  Psoriasis    Patient's complete Health Risk Assessment and screening values have been reviewed and are found in Flowsheets. The following problems were reviewed today and where indicated follow up appointments were made and/or referrals ordered.    Positive Risk Factor Screenings with Interventions:              Inactivity:  On average, how many days per week do you engage in moderate to strenuous exercise (like a brisk walk)?: 1 day (!) Abnormal  On average, how many minutes do you engage in exercise at this level?: 10 min  Interventions:  Patient comments: can't do a lot due to the winter                     Objective   Vitals:    03/06/25 1014   BP: (!) 144/72   Site: Right Upper Arm   Position: Sitting   Cuff Size: Medium Adult   Pulse: 76   Resp: 18   Weight: 77.6 kg (171 lb 2 oz)   Height: 1.702 m (5' 7\")      Body mass index is 26.8 kg/m².                Allergies   Allergen Reactions    Codeine Nausea And Vomiting and Other (See Comments)     headache    Penicillins Rash     Prior to Visit Medications    Medication Sig Taking? Authorizing Provider   vitamin D 50 MCG (2000 UT) CAPS capsule Take 1 capsule by mouth daily Yes Provider, MD Concepcion   sucralfate (CARAFATE) 1 GM tablet Take 1 tablet by mouth 4 times daily (before meals and nightly) Yes Jelani Sanford MD

## 2025-03-11 ENCOUNTER — TELEPHONE (OUTPATIENT)
Dept: FAMILY MEDICINE CLINIC | Age: 70
End: 2025-03-11

## 2025-03-11 RX ORDER — CEFUROXIME AXETIL 500 MG/1
500 TABLET ORAL 2 TIMES DAILY
Qty: 20 TABLET | Refills: 0 | Status: SHIPPED | OUTPATIENT
Start: 2025-03-11 | End: 2025-03-21

## 2025-03-11 NOTE — TELEPHONE ENCOUNTER
Pt called said that this past Sunday she started with swollen glands, left side is more painful, productive cough, sore throat, mucous is pale green in color.  No fever, no nausea.  Pt is wondering since she was just seen if you would call in antibiotic?    [facility], [place], [place]

## 2025-04-30 ENCOUNTER — TELEPHONE (OUTPATIENT)
Dept: PHARMACY | Facility: CLINIC | Age: 70
End: 2025-04-30

## 2025-04-30 NOTE — TELEPHONE ENCOUNTER
Kathy returned an adherence call concerning Atorvastatin 20 mg.     Patient states that she is no longer taking Atorvastatin 20 mg. She states that two days after starting this medication, she had a really severe headache and will not take any more. She states she contacted her doctor's office and they were to note this and take this medication off of her active med list.     She also questioned why we were calling her. I explained that we work for [facility] and help the providers with patient's medications and any issues the patient may be having. She voiced understanding.     Routing to pharmacist to remove Atorvastatin 20 mg from active med list.     Letter deleted.     Maribell Koch CPhT  Population Health    [facility]   [phone] option 1     For Pharmacy Admin Tracking Only    Program: [facility]  CPA in place:  No  Recommendation Provided To: Patient/Caregiver: 1 via Telephone  Intervention Detail: Adherence Monitorin  Intervention Accepted By: Patient/Caregiver: 0  Gap Closed?: Yes   Time Spent (min): 10

## 2025-04-30 NOTE — TELEPHONE ENCOUNTER
[facility]: ADHERENCE REVIEW  Identified care gap per [employer] fills at [facility] : Statin adherence    ASSESSMENT  STATIN ADHERENCE    Insurance Records claims through 2025 (Prior Year PDC = not reported; YTD PDC = FIRST FILL; Potential Fail Date: 6/3/25):   Atorvastatin 20 mg tab last filled on 3/6/25 for 30 day supply. Next refill due: 25    Prescribed si tablet/capsule daily    New Rx 3/6/25 - see OV: \"Patient wants to try and start on cholesterol pills\"    3/1/25 lab result note:  Recent laboratory showed total cholesterol still high at 224 LDL of 152 needs to be on cholesterol pills.  Will prescribe Lipitor if the patient agrees please call patient     Per Reconcile Dispense History:  ATORVASTATIN 20MG   TAB 2025 30 30 each Jelani Sanford MD [id number]   3RR per hyperlink; last Rx 3/6/25 x 30 day supply 3 refills - believe has 3 refills of 30 remaining at pharmacy    Component      Latest Ref Rng 3/1/2025   Cholesterol, Total      100 - 199 mg/dL 224 (H)    Triglycerides      20 - 149 mg/dL 147    VLDL      <30 mg/dL 29    HDL Cholesterol      >=50 mg/dL 43 (L)    LDL Cholesterol      <130 mg/dL 152 (H)    LDL/HDL Ratio      <4.1  3.5    Chol/HDL Ratio      2.0 - 4.5  5.2 (H)       Legend:  (H) High  (L) Low     ALT   Date Value Ref Range Status   2025 14 5 - 33 U/L Final     AST   Date Value Ref Range Status   2025 18 9 - 40 U/L Final     The 10-year ASCVD risk score (Kostas DUFF, et al., 2019) is: 10.7%    Values used to calculate the score:      Age: 70 years      Sex: Female      Is Non- : No      Diabetic: No      Tobacco smoker: No      Systolic Blood Pressure: 144 mmHg      Is BP treated: No      HDL Cholesterol: 43 mg/dL      Total Cholesterol: 224 mg/dL     PLAN  Per insurer report, LIS-1 - may be able to receive up to a 100-day supply for the same cost as a 30-day supply.    The following are interventions

## 2025-06-07 LAB
ALBUMIN: 4.2 G/DL (ref 3.5–5.2)
ALK PHOSPHATASE: 94 U/L (ref 30–146)
ALT SERPL-CCNC: 9 U/L (ref 5–33)
AST SERPL-CCNC: 16 U/L (ref 9–40)
BILIRUB SERPL-MCNC: 0.4 MG/DL
BILIRUBIN DIRECT: <0.2 MG/DL
CHOLESTEROL, TOTAL: 222 MG/DL (ref 100–199)
CHOLESTEROL/HDL RATIO: 6.2 (ref 2–4.5)
HDLC SERPL-MCNC: 36 MG/DL
LDL CHOLESTEROL: 128 MG/DL
LDL/HDL RATIO: 3.6
TOTAL PROTEIN: 6.5 G/DL (ref 6–8.3)
TRIGL SERPL-MCNC: 292 MG/DL (ref 20–149)
VLDLC SERPL CALC-MCNC: 58 MG/DL

## 2025-06-10 ENCOUNTER — TELEPHONE (OUTPATIENT)
Dept: FAMILY MEDICINE CLINIC | Age: 70
End: 2025-06-10

## 2025-06-10 ENCOUNTER — OFFICE VISIT (OUTPATIENT)
Dept: FAMILY MEDICINE CLINIC | Age: 70
End: 2025-06-10

## 2025-06-10 VITALS
HEART RATE: 76 BPM | DIASTOLIC BLOOD PRESSURE: 82 MMHG | BODY MASS INDEX: 26.71 KG/M2 | RESPIRATION RATE: 12 BRPM | HEIGHT: 67 IN | WEIGHT: 170.2 LBS | SYSTOLIC BLOOD PRESSURE: 134 MMHG

## 2025-06-10 DIAGNOSIS — K29.50 CHRONIC GASTRITIS WITHOUT BLEEDING, UNSPECIFIED GASTRITIS TYPE: Primary | ICD-10-CM

## 2025-06-10 DIAGNOSIS — E78.5 HYPERLIPIDEMIA, UNSPECIFIED HYPERLIPIDEMIA TYPE: ICD-10-CM

## 2025-06-10 DIAGNOSIS — K21.00 CHRONIC REFLUX ESOPHAGITIS: ICD-10-CM

## 2025-06-10 DIAGNOSIS — Z12.11 ENCOUNTER FOR SCREENING COLONOSCOPY: ICD-10-CM

## 2025-06-10 DIAGNOSIS — Z12.31 ENCOUNTER FOR SCREENING MAMMOGRAM FOR MALIGNANT NEOPLASM OF BREAST: ICD-10-CM

## 2025-06-10 DIAGNOSIS — Z78.0 POSTMENOPAUSAL: ICD-10-CM

## 2025-06-10 DIAGNOSIS — L40.9 PSORIASIS: ICD-10-CM

## 2025-06-10 PROCEDURE — 1123F ACP DISCUSS/DSCN MKR DOCD: CPT | Performed by: EMERGENCY MEDICINE

## 2025-06-10 PROCEDURE — 99214 OFFICE O/P EST MOD 30 MIN: CPT | Performed by: EMERGENCY MEDICINE

## 2025-06-10 RX ORDER — SUCRALFATE 1 G/1
1 TABLET ORAL
Qty: 120 TABLET | Refills: 3 | Status: SHIPPED | OUTPATIENT
Start: 2025-06-10

## 2025-06-10 RX ORDER — PANTOPRAZOLE SODIUM 40 MG/1
40 TABLET, DELAYED RELEASE ORAL 2 TIMES DAILY
Qty: 180 TABLET | Refills: 1 | Status: SHIPPED | OUTPATIENT
Start: 2025-06-10

## 2025-06-10 RX ORDER — CLOBETASOL PROPIONATE 0.05 G/100ML
SHAMPOO TOPICAL
Qty: 118 ML | Refills: 2 | Status: SHIPPED | OUTPATIENT
Start: 2025-06-10

## 2025-06-10 ASSESSMENT — ENCOUNTER SYMPTOMS
TROUBLE SWALLOWING: 0
RHINORRHEA: 0
SHORTNESS OF BREATH: 0
CHEST TIGHTNESS: 0
NAUSEA: 0
CHOKING: 0
SINUS PRESSURE: 0
ABDOMINAL PAIN: 0
DIARRHEA: 0
VOMITING: 0
WHEEZING: 0
VOICE CHANGE: 0
COUGH: 0
BACK PAIN: 0
SORE THROAT: 0
CONSTIPATION: 0

## 2025-06-10 NOTE — PROGRESS NOTES
GERD.     Discussed use, benefit, and side effects of prescribedmedications. Current home medications reviewed and updated with the patient. All patient questions answered.  Pt voiced understanding.  Instructedto continue current medications, diet and exercise.  Patient agreed with treatment plan.